# Patient Record
Sex: FEMALE | Race: WHITE | NOT HISPANIC OR LATINO | Employment: FULL TIME | URBAN - METROPOLITAN AREA
[De-identification: names, ages, dates, MRNs, and addresses within clinical notes are randomized per-mention and may not be internally consistent; named-entity substitution may affect disease eponyms.]

---

## 2020-12-24 ENCOUNTER — OFFICE VISIT (OUTPATIENT)
Dept: URGENT CARE | Facility: CLINIC | Age: 25
End: 2020-12-24
Payer: OTHER GOVERNMENT

## 2020-12-24 VITALS
RESPIRATION RATE: 18 BRPM | TEMPERATURE: 99 F | OXYGEN SATURATION: 99 % | WEIGHT: 120 LBS | HEART RATE: 73 BPM | BODY MASS INDEX: 19.99 KG/M2 | DIASTOLIC BLOOD PRESSURE: 70 MMHG | HEIGHT: 65 IN | SYSTOLIC BLOOD PRESSURE: 124 MMHG

## 2020-12-24 DIAGNOSIS — S05.91XA RIGHT EYE INJURY, INITIAL ENCOUNTER: ICD-10-CM

## 2020-12-24 DIAGNOSIS — S05.01XA ABRASION OF RIGHT CORNEA, INITIAL ENCOUNTER: Primary | ICD-10-CM

## 2020-12-24 PROBLEM — G43.909 MIGRAINE: Status: ACTIVE | Noted: 2020-12-24

## 2020-12-24 PROBLEM — L90.5 ACNE SCARRING: Status: ACTIVE | Noted: 2019-03-06

## 2020-12-24 PROBLEM — F31.9 BIPOLAR DISORDER: Status: ACTIVE | Noted: 2020-12-24

## 2020-12-24 PROBLEM — F42.9 OBSESSIVE-COMPULSIVE DISORDER, UNSPECIFIED: Status: ACTIVE | Noted: 2020-12-24

## 2020-12-24 PROBLEM — L70.0 ACNE VULGARIS: Status: ACTIVE | Noted: 2019-03-06

## 2020-12-24 PROBLEM — L73.0 ACNE SCARRING: Status: ACTIVE | Noted: 2019-03-06

## 2020-12-24 PROCEDURE — 99202 PR OFFICE/OUTPT VISIT, NEW, LEVL II, 15-29 MIN: ICD-10-PCS | Mod: S$GLB,,, | Performed by: EMERGENCY MEDICINE

## 2020-12-24 PROCEDURE — 99202 OFFICE O/P NEW SF 15 MIN: CPT | Mod: S$GLB,,, | Performed by: EMERGENCY MEDICINE

## 2020-12-24 RX ORDER — DOXYCYCLINE 100 MG/1
100 CAPSULE ORAL
COMMUNITY
Start: 2020-04-08 | End: 2021-04-08

## 2020-12-24 RX ORDER — PAROXETINE HYDROCHLORIDE 20 MG/1
20 TABLET, FILM COATED ORAL DAILY
COMMUNITY
Start: 2020-09-21

## 2020-12-24 RX ORDER — OXCARBAZEPINE 600 MG/1
TABLET, FILM COATED ORAL
COMMUNITY
End: 2022-11-22 | Stop reason: ALTCHOICE

## 2020-12-24 RX ORDER — SUMATRIPTAN SUCCINATE 100 MG/1
TABLET ORAL
COMMUNITY
Start: 2020-06-05 | End: 2022-11-22

## 2020-12-24 RX ORDER — SPIRONOLACTONE 50 MG/1
50 TABLET, FILM COATED ORAL
COMMUNITY
Start: 2020-10-14 | End: 2023-02-07 | Stop reason: SDUPTHER

## 2020-12-24 RX ORDER — TOBRAMYCIN 3 MG/ML
1 SOLUTION/ DROPS OPHTHALMIC EVERY 4 HOURS
Qty: 5 ML | Refills: 0 | Status: SHIPPED | OUTPATIENT
Start: 2020-12-24 | End: 2020-12-31

## 2020-12-24 RX ORDER — TOBRAMYCIN 3 MG/ML
2 SOLUTION/ DROPS OPHTHALMIC EVERY 4 HOURS
Status: DISCONTINUED | OUTPATIENT
Start: 2020-12-24 | End: 2020-12-24

## 2020-12-24 RX ORDER — NORGESTIMATE AND ETHINYL ESTRADIOL 7DAYSX3 LO
KIT ORAL
COMMUNITY
Start: 2020-10-16 | End: 2023-08-11

## 2020-12-24 RX ORDER — QUETIAPINE FUMARATE 25 MG/1
TABLET, FILM COATED ORAL
COMMUNITY
Start: 2020-02-20 | End: 2023-01-06

## 2020-12-24 RX ORDER — TRETINOIN 0.25 MG/G
CREAM TOPICAL
COMMUNITY
Start: 2020-10-14 | End: 2021-10-14

## 2020-12-24 NOTE — PATIENT INSTRUCTIONS
Tobramycin as prescribed for the next 7 days.  You will be contacted by occupational medicine to follow-up for re-evaluation of this injury. If you have not received a call in 3 days, call patient nell at [phone] to inquire about your appointment.        Corneal Abrasion    You have received a scratch or scrape (abrasion) to your cornea. The cornea is the clear part in the front of the eye. This sensitive area is very painful when injured. You may make tears frequently, and your vision may be blurry until the injury heals. You may be sensitive to light.  This part of the body heals quickly. You can expect the pain to go away within 24 to 48 hours. If the abrasion is large or deep, your doctor may apply an eye patch, although this is not always done. An antibiotic ointment or eye drops may also be used to prevent infection.  Numbing drops may be used to relieve the pain temporarily so that your eyes can be examined. However, these drops cannot be prescribed for home use because that would prevent healing and lead to more serious problems. Also, if you cant feel your eye, there is a chance of accidentally injuring it further without knowing it.  Home care  · A cold pack (ice in a plastic bag, wrapped in a towel) may be applied over the eye (or eye patch) for 20 minutes at a time, to reduce pain.  · You may use acetaminophen or ibuprofen to control pain, unless another pain medicine was prescribed. Note: If you have chronic liver or kidney disease or ever had a stomach ulcer or GI bleeding, talk with your doctor before using these medicines.  · Rest your eyes and do not read until symptoms are gone.  · If you use contact lenses, do not wear them until all symptoms are gone.  · If your vision is affected by the corneal abrasion or if an eye patch was applied, do not drive a motor vehicle or operate machinery until all symptoms are gone. You may have trouble judging distances using only one eye.  · If your  eyes are sensitive to light, try wearing sunglasses, or stay indoors until symptoms go away.  Follow-up care  Follow up with your health care provider, or as advised.  · If no patch was put on your eye, and used but the pain continues for more than 48 hours, you should have another exam. Return to this facility or contact your health care provider to arrange this.  · If your eye was patched and you were asked to remove the patch yourself, see your health care provider. You may also return to this facility if you still have pain after the patch is removed.  · If you were given a return appointment for patch removal and re-examination, be sure to keep the appointment. Leaving the patch in place longer than advised could be harmful.  When to seek medical advice  Call your health care provider right away if any of these occur.  · Increasing eye pain or pain that does not improve after 24 hours  · Discharge from the eye  · Increasing redness of the eye or swelling of the eyelids  · Worsening vision  · Symptoms that worsen after the abrasion has healed  Date Last Reviewed: 6/14/2015  © 0968-4423 [employer] [place]. [address] [place] [address]. All rights reserved. This information is not intended as a substitute for professional medical care. Always follow your healthcare professional's instructions.

## 2020-12-24 NOTE — PROGRESS NOTES
Subjective:       Patient ID: Megan Estrada is a 25 y.o. female.    Chief Complaint: No chief complaint on file.    Pt was handling paper at work just prior to arrival and pulled it towards her face. Pt believes she may have cut her right eye.  Pt states her right eye is a little blurry when she closes her eye left eye.  Patient reports pain with blinking.    Vision Test:    Right Eye: 20/40  Left Eye: 20/30  Both Eyes: 20/20    Eye Injury   The right eye is affected. This is a new problem. The current episode started today. Associated symptoms include blurred vision. Pertinent negatives include no eye discharge, double vision, eye redness, fever, itching, nausea, photophobia or vomiting.       Constitution: Negative for chills and fever.   HENT: Negative for congestion and sinus pain.    Eyes: Positive for blurred vision. Negative for eye trauma, foreign body in eye, eye discharge, eye itching, eye pain, eye redness, photophobia, vision loss, double vision and eyelid swelling.   Gastrointestinal: Negative for nausea and vomiting.   Genitourinary: Negative for history of kidney stones.   Skin: Negative for rash.   Allergic/Immunologic: Negative for seasonal allergies and itching.   Neurological: Negative for headaches.        Objective:      Physical Exam  Vitals signs and nursing note reviewed.   Constitutional:       Appearance: Normal appearance. She is not ill-appearing.   HENT:      Head: Normocephalic and atraumatic.   Eyes:      Extraocular Movements: Extraocular movements intact.      Conjunctiva/sclera: Conjunctivae normal.      Pupils: Pupils are equal, round, and reactive to light.      Comments: With the anesthetic fluorescien combination, patient experiences relief of her discomfort in the eye.  There is not any evidence of increased uptake on this surface of the eye.   Neurological:      Mental Status: She is alert.         Assessment:       1. Abrasion of right cornea, initial encounter    2. Right eye  injury, initial encounter        Plan:         Medications Ordered This Encounter   Medications    tobramycin sulfate 0.3% (TOBREX) 0.3 % ophthalmic solution     Sig: Place 1 drop into the right eye every 4 (four) hours. for 7 days     Dispense:  5 mL     Refill:  0            No follow-ups on file.    despite not seeing an actual corneal abrasion, I will treat as such given the mechanism and patient's response to the eyedrops.  Patient follow-up with occupational med.

## 2020-12-24 NOTE — PROGRESS NOTES
"Subjective:       Patient ID: Megan Estrada is a 25 y.o. female.    Vitals:  height is 5' 5" (1.651 m) and weight is 54.4 kg (120 lb). Her temperature is 98.9 °F (37.2 °C). Her blood pressure is 124/70 and her pulse is 73. Her respiration is 18 and oxygen saturation is 99%.     Chief Complaint: No chief complaint on file.    Pt was handling paper and pulled it towards her face. Pt believes she may have cut her right eye.  Pt states her right eye is a little blurry when she closes her eye left eye.    Vision Test:    Right Eye: 20/40  Left Eye: 20/30  Both Eyes: 20/20    Eye Injury   The right eye is affected. This is a new problem. The current episode started today. The problem occurs constantly. Associated symptoms include blurred vision. Pertinent negatives include no eye discharge, double vision, eye redness, fever, itching, nausea, photophobia or vomiting.       Constitution: Negative for chills and fever.   HENT: Negative for congestion and sinus pain.    Eyes: Positive for blurred vision. Negative for eye trauma, foreign body in eye, eye discharge, eye itching, eye pain, eye redness, photophobia, vision loss, double vision and eyelid swelling.   Gastrointestinal: Negative for nausea and vomiting.   Genitourinary: Negative for history of kidney stones.   Skin: Negative for rash.   Allergic/Immunologic: Negative for seasonal allergies and itching.   Neurological: Negative for headaches.       Objective:      Physical Exam      Assessment:       No diagnosis found.    Plan:         There are no diagnoses linked to this encounter.       "

## 2020-12-24 NOTE — LETTER
[facility]  [address]  [place] [address]  Phone: [phone]  [facility]: [phone]    Pt Name: Megan Estrada  Injury Date: 12/24/2020   Employee ID:  Date of First Treatment: 12/24/2020   Company: [employer] reference to record [id number]      Appointment Time: 02:50 PM Arrived: 3:13   Provider: [facility] HEALTH PROVIDER, [facility] Time Out:3:45     Office Treatment:   1. Abrasion of right cornea, initial encounter    2. Right eye injury, initial encounter                     Return Appointment: you will be contacted by [facility] for follow up.

## 2022-03-10 ENCOUNTER — CLINICAL SUPPORT (OUTPATIENT)
Dept: OTHER | Facility: CLINIC | Age: 27
End: 2022-03-10
Payer: OTHER GOVERNMENT

## 2022-03-10 DIAGNOSIS — Z00.8 ENCOUNTER FOR OTHER GENERAL EXAMINATION: ICD-10-CM

## 2022-03-11 VITALS — BODY MASS INDEX: 19.37 KG/M2 | HEIGHT: 66 IN

## 2022-03-11 LAB
HDLC SERPL-MCNC: 54 MG/DL
POC CHOLESTEROL, LDL (DOCK): 114.2 MG/DL
POC CHOLESTEROL, TOTAL: 192 MG/DL
POC GLUCOSE, FASTING: 80 MG/DL (ref 60–110)
TRIGL SERPL-MCNC: 119 MG/DL

## 2022-11-22 ENCOUNTER — OFFICE VISIT (OUTPATIENT)
Dept: INTERNAL MEDICINE | Facility: CLINIC | Age: 27
End: 2022-11-22
Payer: COMMERCIAL

## 2022-11-22 VITALS
OXYGEN SATURATION: 99 % | BODY MASS INDEX: 19.91 KG/M2 | HEART RATE: 82 BPM | DIASTOLIC BLOOD PRESSURE: 62 MMHG | SYSTOLIC BLOOD PRESSURE: 124 MMHG | HEIGHT: 66 IN | WEIGHT: 123.88 LBS | TEMPERATURE: 97 F

## 2022-11-22 DIAGNOSIS — G43.709 CHRONIC MIGRAINE WITHOUT AURA WITHOUT STATUS MIGRAINOSUS, NOT INTRACTABLE: ICD-10-CM

## 2022-11-22 DIAGNOSIS — F31.9 BIPOLAR AFFECTIVE DISORDER, REMISSION STATUS UNSPECIFIED: ICD-10-CM

## 2022-11-22 DIAGNOSIS — R53.83 FATIGUE, UNSPECIFIED TYPE: Primary | ICD-10-CM

## 2022-11-22 DIAGNOSIS — L65.9 HAIR LOSS: ICD-10-CM

## 2022-11-22 DIAGNOSIS — R87.619 ABNORMAL CERVICAL PAPANICOLAOU SMEAR, UNSPECIFIED ABNORMAL PAP FINDING: ICD-10-CM

## 2022-11-22 DIAGNOSIS — R79.89 LOW SERUM CORTISOL LEVEL: ICD-10-CM

## 2022-11-22 PROCEDURE — 3008F PR BODY MASS INDEX (BMI) DOCUMENTED: ICD-10-PCS | Mod: CPTII,S$GLB,, | Performed by: FAMILY MEDICINE

## 2022-11-22 PROCEDURE — 3008F BODY MASS INDEX DOCD: CPT | Mod: CPTII,S$GLB,, | Performed by: FAMILY MEDICINE

## 2022-11-22 PROCEDURE — 3074F PR MOST RECENT SYSTOLIC BLOOD PRESSURE < 130 MM HG: ICD-10-PCS | Mod: CPTII,S$GLB,, | Performed by: FAMILY MEDICINE

## 2022-11-22 PROCEDURE — 1159F PR MEDICATION LIST DOCUMENTED IN MEDICAL RECORD: ICD-10-PCS | Mod: CPTII,S$GLB,, | Performed by: FAMILY MEDICINE

## 2022-11-22 PROCEDURE — 99204 PR OFFICE/OUTPT VISIT, NEW, LEVL IV, 45-59 MIN: ICD-10-PCS | Mod: S$GLB,,, | Performed by: FAMILY MEDICINE

## 2022-11-22 PROCEDURE — 3078F PR MOST RECENT DIASTOLIC BLOOD PRESSURE < 80 MM HG: ICD-10-PCS | Mod: CPTII,S$GLB,, | Performed by: FAMILY MEDICINE

## 2022-11-22 PROCEDURE — 99204 OFFICE O/P NEW MOD 45 MIN: CPT | Mod: S$GLB,,, | Performed by: FAMILY MEDICINE

## 2022-11-22 PROCEDURE — 1159F MED LIST DOCD IN RCRD: CPT | Mod: CPTII,S$GLB,, | Performed by: FAMILY MEDICINE

## 2022-11-22 PROCEDURE — 99999 PR PBB SHADOW E&M-EST. PATIENT-LVL IV: CPT | Mod: PBBFAC,,, | Performed by: FAMILY MEDICINE

## 2022-11-22 PROCEDURE — 99999 PR PBB SHADOW E&M-EST. PATIENT-LVL IV: ICD-10-PCS | Mod: PBBFAC,,, | Performed by: FAMILY MEDICINE

## 2022-11-22 PROCEDURE — 3074F SYST BP LT 130 MM HG: CPT | Mod: CPTII,S$GLB,, | Performed by: FAMILY MEDICINE

## 2022-11-22 PROCEDURE — 3078F DIAST BP <80 MM HG: CPT | Mod: CPTII,S$GLB,, | Performed by: FAMILY MEDICINE

## 2022-11-22 RX ORDER — UBROGEPANT 100 MG/1
TABLET ORAL
COMMUNITY

## 2022-11-22 RX ORDER — ERENUMAB-AOOE 70 MG/ML
70 INJECTION SUBCUTANEOUS
COMMUNITY
Start: 2022-03-21

## 2022-11-22 RX ORDER — TRAZODONE HYDROCHLORIDE 100 MG/1
100 TABLET ORAL NIGHTLY
COMMUNITY

## 2022-11-22 NOTE — PATIENT INSTRUCTIONS
We are requesting records from your recent procedure and imaging.   Please upload a copy of the lab results to [facility] that showed the low cortisol and low testosterone  If you develop any signs of another respiratory infection, please reach out to us.

## 2022-11-22 NOTE — PROGRESS NOTES
Subjective:      Patient ID: Megan Estrada is a 27 y.o. female.    Chief Complaint: Establish Care    Pt had two sinus infections.  She is finishing up steroids and Augmentin.  Had COVID in August. She is concerned because she keeps getting frequent respiratory infections. She has never been tested for any immune deficiency.     Pt had LEEP procedure with Dr. Clay at [facility] in September. Had abnormal paps proceeding. Had some prolonged bleeding after procedure but that has since resolved. She had US done by GYN at [facility].     Pt still having generalized hair loss. No improvement with current medications. Recent labs drawn by GYN showed normal thyroid. Pt also complains of feeling exhausted all the time and weak.  She had low am cortisol on recent GYN labs.     The patient's Health Maintenance was reviewed and the following appears to be due at this time:   Health Maintenance Due   Topic Date Due    Hepatitis C Screening  Never done    HIV Screening  Never done    TETANUS VACCINE  Never done    Pap Smear  Never done    COVID-19 Vaccine (4 - Booster for Pfizer series) 02/02/2022    Influenza Vaccine (1) 09/01/2022        Past Medical History:  Past Medical History:   Diagnosis Date    Bipolar disorder     Obsessive-compulsive disorder      Past Surgical History:   Procedure Laterality Date    CERVICAL BIOPSY  W/ LOOP ELECTRODE EXCISION      lipo on chin       Review of patient's allergies indicates:   Allergen Reactions    Adhesive     Latex      Social History     Socioeconomic History    Marital status: Single   Occupational History     Comment: [employer]   Tobacco Use    Smoking status: Never    Smokeless tobacco: Never   Substance and Sexual Activity    Alcohol use: Yes    Drug use: Never     Family History   Problem Relation Age of Onset    Bipolar disorder Father        Review of Systems   Constitutional:  Negative for chills, fatigue and unexpected weight change.   Respiratory:  Negative  "for cough and shortness of breath.    Cardiovascular:  Negative for chest pain.   Gastrointestinal:  Negative for abdominal pain, nausea and vomiting.   Endocrine: Positive for cold intolerance and heat intolerance.        + hair loss   Genitourinary:  Positive for pelvic pain. Negative for dysuria and frequency.   Musculoskeletal:  Positive for back pain (lower back pain around menses). Negative for arthralgias.   Neurological:  Positive for weakness. Negative for dizziness.   Psychiatric/Behavioral:  Negative for sleep disturbance and suicidal ideas.       Objective:   /62   Pulse 82   Temp 97.4 °F (36.3 °C)   Ht 5' 6" (1.676 m)   Wt 56.2 kg (123 lb 14.4 oz)   SpO2 99%   BMI 20.00 kg/m²     Physical Exam  Vitals and nursing note reviewed.   Constitutional:       Appearance: Normal appearance.   HENT:      Head: Normocephalic.      Comments: Temporal thinning of hair; no areas of alopecia  Eyes:      Conjunctiva/sclera: Conjunctivae normal.   Cardiovascular:      Rate and Rhythm: Normal rate and regular rhythm.   Pulmonary:      [place]: Pulmonary effort is normal.      Breath sounds: Normal breath sounds.   Abdominal:      General: Abdomen is flat. There is no distension.      Palpations: Abdomen is soft.      Tenderness: There is no abdominal tenderness.   Skin:     General: Skin is warm and dry.   Neurological:      Mental Status: She is alert and oriented to person, place, and time. Mental status is at baseline.      Gait: Gait normal.   Psychiatric:         Mood and Affect: Mood normal.         Behavior: Behavior normal.         Thought Content: Thought content normal.       1. Fatigue, unspecified type    2. Abnormal cervical Papanicolaou smear, unspecified abnormal pap finding    3. Low serum cortisol level    4. Hair loss    5. Chronic migraine without aura without status migrainosus, not intractable    6. Bipolar affective disorder, remission status unspecified      Plan:     Follow up in about " 8 weeks (around 1/17/2023).    1. Fatigue, unspecified type  Comments:  unclear etiology at this time. requesting records from recent procedures and imaging. Referring to endo based on low cortisol labs    2. Abnormal cervical Papanicolaou smear, unspecified abnormal pap finding  Comments:  Had Leep procedure performed by Dr. Clay in Sept 2022, requesting records  Overview:  Leep performed in Sept 2022      3. Low serum cortisol level  -     Ambulatory referral/consult to Endocrinology; Future; Expected date: 11/29/2022    4. Hair loss  Comments:  no improvement with spirinolactone. Recent labs showed normal thyroid.   Orders:  -     Ambulatory referral/consult to Endocrinology; Future; Expected date: 11/29/2022    5. Chronic migraine without aura without status migrainosus, not intractable  Overview:  Follows with Dr. Hernandez     Assessment & Plan:  Currently controlled.  Reviewed migraine medications with patient and it does not appear any of these would be causing her symptoms of hair loss and fatigue. Reviewed records from pt's neurologist      6. Bipolar affective disorder, remission status unspecified  Overview:  Dr. Papito Dillon    Assessment & Plan:  Well controlled.  No longer taking Trileptal.  Continue following with Dr. Dillon.       Orders Placed This Encounter   Procedures    Ambulatory referral/consult to Endocrinology     Standing Status:   Future     Standing Expiration Date:   12/22/2023     Referral Priority:   Routine     Referral Type:   Consultation     Requested Specialty:   Endocrinology     Number of Visits Requested:   1       Medication List with Changes/Refills   Current Medications    ERENUMAB-AOOE (AIMOVIG AUTOINJECTOR) 70 MG/ML AUTOINJECTOR    Inject 70 mg into the skin.    PAROXETINE (PAXIL) 40 MG TABLET    TK 1 T PO QD IN THE MORNING    QUETIAPINE (SEROQUEL) 25 MG TAB    TK 1 T PO  QHS    SPIRONOLACTONE (ALDACTONE) 50 MG TABLET    Take 50 mg by mouth.    TRAZODONE (DESYREL)  100 MG TABLET    Take 100 mg by mouth every evening.    TRI-LO-SPRINTEC 0.18/0.215/0.25 MG-25 MCG TABLET    TK 1 T PO QD    UBROGEPANT (UBRELVY) 100 MG TABLET    Take by mouth.   Discontinued Medications    OXCARBAZEPINE (TRILEPTAL) 600 MG TAB        SUMATRIPTAN (IMITREX) 100 MG TABLET    Take 1 po qd prn migraine, may repeat w/1 in 2 hrs. Not to exceed 2 doses in a 24 hr period.

## 2022-11-22 NOTE — ASSESSMENT & PLAN NOTE
Currently controlled.  Reviewed migraine medications with patient and it does not appear any of these would be causing her symptoms of hair loss and fatigue. Reviewed records from pt's neurologist

## 2022-12-08 ENCOUNTER — PATIENT MESSAGE (OUTPATIENT)
Dept: INTERNAL MEDICINE | Facility: CLINIC | Age: 27
End: 2022-12-08
Payer: COMMERCIAL

## 2023-01-06 ENCOUNTER — OFFICE VISIT (OUTPATIENT)
Dept: ENDOCRINOLOGY | Facility: CLINIC | Age: 28
End: 2023-01-06
Payer: COMMERCIAL

## 2023-01-06 VITALS
SYSTOLIC BLOOD PRESSURE: 115 MMHG | HEART RATE: 97 BPM | WEIGHT: 126.56 LBS | BODY MASS INDEX: 20.34 KG/M2 | DIASTOLIC BLOOD PRESSURE: 69 MMHG | HEIGHT: 66 IN

## 2023-01-06 DIAGNOSIS — L65.9 HAIR LOSS: ICD-10-CM

## 2023-01-06 DIAGNOSIS — R68.89 ABNORMAL ENDOCRINE LABORATORY TEST FINDING: ICD-10-CM

## 2023-01-06 DIAGNOSIS — R79.89 LOW SERUM CORTISOL LEVEL: Primary | ICD-10-CM

## 2023-01-06 PROCEDURE — 3008F BODY MASS INDEX DOCD: CPT | Mod: CPTII,S$GLB,, | Performed by: INTERNAL MEDICINE

## 2023-01-06 PROCEDURE — 3078F PR MOST RECENT DIASTOLIC BLOOD PRESSURE < 80 MM HG: ICD-10-PCS | Mod: CPTII,S$GLB,, | Performed by: INTERNAL MEDICINE

## 2023-01-06 PROCEDURE — 1159F MED LIST DOCD IN RCRD: CPT | Mod: CPTII,S$GLB,, | Performed by: INTERNAL MEDICINE

## 2023-01-06 PROCEDURE — 1159F PR MEDICATION LIST DOCUMENTED IN MEDICAL RECORD: ICD-10-PCS | Mod: CPTII,S$GLB,, | Performed by: INTERNAL MEDICINE

## 2023-01-06 PROCEDURE — 99204 OFFICE O/P NEW MOD 45 MIN: CPT | Mod: S$GLB,,, | Performed by: INTERNAL MEDICINE

## 2023-01-06 PROCEDURE — 99999 PR PBB SHADOW E&M-EST. PATIENT-LVL III: ICD-10-PCS | Mod: PBBFAC,,, | Performed by: INTERNAL MEDICINE

## 2023-01-06 PROCEDURE — 3074F PR MOST RECENT SYSTOLIC BLOOD PRESSURE < 130 MM HG: ICD-10-PCS | Mod: CPTII,S$GLB,, | Performed by: INTERNAL MEDICINE

## 2023-01-06 PROCEDURE — 99999 PR PBB SHADOW E&M-EST. PATIENT-LVL III: CPT | Mod: PBBFAC,,, | Performed by: INTERNAL MEDICINE

## 2023-01-06 PROCEDURE — 3008F PR BODY MASS INDEX (BMI) DOCUMENTED: ICD-10-PCS | Mod: CPTII,S$GLB,, | Performed by: INTERNAL MEDICINE

## 2023-01-06 PROCEDURE — 99204 PR OFFICE/OUTPT VISIT, NEW, LEVL IV, 45-59 MIN: ICD-10-PCS | Mod: S$GLB,,, | Performed by: INTERNAL MEDICINE

## 2023-01-06 PROCEDURE — 3078F DIAST BP <80 MM HG: CPT | Mod: CPTII,S$GLB,, | Performed by: INTERNAL MEDICINE

## 2023-01-06 PROCEDURE — 3074F SYST BP LT 130 MM HG: CPT | Mod: CPTII,S$GLB,, | Performed by: INTERNAL MEDICINE

## 2023-01-06 RX ORDER — HYDROXYZINE PAMOATE 25 MG/1
CAPSULE ORAL
COMMUNITY
Start: 2022-12-18 | End: 2023-04-12

## 2023-01-06 NOTE — ASSESSMENT & PLAN NOTE
While hair loss can be a symptom of adrenal insufficiency, the COVID infection in August raises the possibility of telogen effluvium which should improve with time. Will also check ferritin levels to rule out iron deficiency as a cause of hair loss.

## 2023-01-06 NOTE — ASSESSMENT & PLAN NOTE
Her symptoms along with low 8AM cortisol would be consisent with adrenal insufficiency. However, if she was on steroids at that time, then the low cortisol would be a normal result. Low DHEAS and testosterone would also be consistent with exogenous corticosteroids. Will repeat 8AM labs including cortisol, ACTH, CMP and renin. The results of these tests will dictate further workup and management.

## 2023-01-06 NOTE — PROGRESS NOTES
NEW PATIENT VISIT    Subjective:      Chief Complaint: Abnormal Lab    HPI: Megan Estrada is a 27 y.o. female who is here for an initial evaluation for adrenal insufficiency evaluation      Past Medical History:   Diagnosis Date    Bipolar disorder     Obsessive-compulsive disorder      Referred for low serum cortisol on testing done at [facility].  Labs were drawn at 8AM    3-4 sinus infections this year requiring prednisone. Last was October/November. She's not sure if she was on steroids around the time her blood was drawn or not.    She had a fairly severe episode of COVID19 in August 2022, but her symptoms started just before that in approximately June/July. Symptoms include:  Losing hair in clumps  Easily fatigued and tired  Poor libido  Dizzy and light headed when standing up quickly.  Poor appetite    No significant weight changes  No changes in skin pigmentation    FH: grandfather and grand uncle - lung cancer; cervical/breast cancer  SH: No tobacco, social EtOH; works as a     Takes spironolactone 50 mg daily, but usually only does this when her acne is flaring. She is not taking it currently. She has been on OCPS (currently Tri-lo-Sprintec - norgestimate and ethinyl estradiol) since age 12 for abnormal cycles. Her OBGYN has diagnosed her with PCOS. She denies hirsutism or other signs of virilization. +Acne. Had transvag U/S at some point, but she's not sure the result of that test.    8AM labs:      Reviewed past medical, family, social history and updated as appropriate.    Objective:     Vitals:    01/06/23 1406   BP: 115/69   Pulse: 97         BP Readings from Last 5 Encounters:   01/06/23 115/69   11/22/22 124/62   12/24/20 124/70         Physical Exam  Vitals and nursing note reviewed.   Constitutional:       General: She is not in acute distress.     Appearance: Normal appearance. She is well-developed. She is not ill-appearing.   HENT:      Head: Normocephalic and atraumatic.   Eyes:       General:         Right eye: No discharge.         Left eye: No discharge.      Conjunctiva/sclera: Conjunctivae normal.   Neck:      Thyroid: No thyroid mass, thyromegaly or thyroid tenderness.      Trachea: No tracheal deviation.   Cardiovascular:      Rate and Rhythm: Regular rhythm.      Comments: Borderline tachycardia  Pulmonary:      [place]: Pulmonary effort is normal. No respiratory distress.      Breath sounds: Normal breath sounds.   Musculoskeletal:      Comments: No digital clubbing or extremity cyanosis   Skin:     Comments: Fair complexion (baseline per patient).  Negative hyperpigmentation of the palmar creases or buccal mucosa   Neurological:      Mental Status: She is alert and oriented to person, place, and time.      Coordination: Coordination normal.   Psychiatric:         Mood and Affect: Mood normal.         Behavior: Behavior normal.         Wt Readings from Last 30 Encounters:   01/06/23 1406 57.4 kg (126 lb 8.7 oz)   11/22/22 0818 56.2 kg (123 lb 14.4 oz)   12/24/20 1516 54.4 kg (120 lb)       No results found for: HGBA1C  No results found for: CHOL, HDL, LDLCALC, TRIG, CHOLHDL  No results found for: NA, K, CL, CO2, GLU, BUN, CREATININE, CALCIUM, PROT, ALBUMIN, BILITOT, ALKPHOS, AST, ALT, ANIONGAP, ESTGFRAFRICA, EGFRNONAA, TSH   No results found for: MICALBCREAT    Assessment/Plan:     Abnormal endocrine laboratory test finding  Her symptoms along with low 8AM cortisol would be consisent with adrenal insufficiency. However, if she was on steroids at that time, then the low cortisol would be a normal result. Low DHEAS and testosterone would also be consistent with exogenous corticosteroids. Will repeat 8AM labs including cortisol, ACTH, CMP and renin. The results of these tests will dictate further workup and management.    Hair loss  While hair loss can be a symptom of adrenal insufficiency, the COVID infection in August raises the possibility of telogen effluvium which should improve with  time. Will also check ferritin levels to rule out iron deficiency as a cause of hair loss.

## 2023-01-10 ENCOUNTER — LAB VISIT (OUTPATIENT)
Dept: LAB | Facility: HOSPITAL | Age: 28
End: 2023-01-10
Payer: COMMERCIAL

## 2023-01-10 DIAGNOSIS — R79.89 LOW SERUM CORTISOL LEVEL: ICD-10-CM

## 2023-01-10 DIAGNOSIS — L65.9 HAIR LOSS: ICD-10-CM

## 2023-01-10 LAB
ALBUMIN SERPL BCP-MCNC: 3.8 G/DL (ref 3.5–5.2)
ALP SERPL-CCNC: 32 U/L (ref 55–135)
ALT SERPL W/O P-5'-P-CCNC: 28 U/L (ref 10–44)
ANION GAP SERPL CALC-SCNC: 13 MMOL/L (ref 8–16)
AST SERPL-CCNC: 24 U/L (ref 10–40)
BASOPHILS # BLD AUTO: 0.03 K/UL (ref 0–0.2)
BASOPHILS NFR BLD: 0.5 % (ref 0–1.9)
BILIRUB SERPL-MCNC: 0.6 MG/DL (ref 0.1–1)
BUN SERPL-MCNC: 6 MG/DL (ref 6–20)
CALCIUM SERPL-MCNC: 8.9 MG/DL (ref 8.7–10.5)
CHLORIDE SERPL-SCNC: 103 MMOL/L (ref 95–110)
CO2 SERPL-SCNC: 23 MMOL/L (ref 23–29)
CORTIS SERPL-MCNC: 13.5 UG/DL (ref 4.3–22.4)
CREAT SERPL-MCNC: 0.7 MG/DL (ref 0.5–1.4)
DIFFERENTIAL METHOD: ABNORMAL
EOSINOPHIL # BLD AUTO: 0.1 K/UL (ref 0–0.5)
EOSINOPHIL NFR BLD: 2 % (ref 0–8)
ERYTHROCYTE [DISTWIDTH] IN BLOOD BY AUTOMATED COUNT: 12.6 % (ref 11.5–14.5)
EST. GFR  (NO RACE VARIABLE): >60 ML/MIN/1.73 M^2
FERRITIN SERPL-MCNC: 51 NG/ML (ref 20–300)
GLUCOSE SERPL-MCNC: 112 MG/DL (ref 70–110)
HCT VFR BLD AUTO: 38.9 % (ref 37–48.5)
HGB BLD-MCNC: 12.4 G/DL (ref 12–16)
IMM GRANULOCYTES # BLD AUTO: 0 K/UL (ref 0–0.04)
IMM GRANULOCYTES NFR BLD AUTO: 0 % (ref 0–0.5)
LYMPHOCYTES # BLD AUTO: 3.3 K/UL (ref 1–4.8)
LYMPHOCYTES NFR BLD: 51.7 % (ref 18–48)
MCH RBC QN AUTO: 29.5 PG (ref 27–31)
MCHC RBC AUTO-ENTMCNC: 31.9 G/DL (ref 32–36)
MCV RBC AUTO: 93 FL (ref 82–98)
MONOCYTES # BLD AUTO: 0.5 K/UL (ref 0.3–1)
MONOCYTES NFR BLD: 7.9 % (ref 4–15)
NEUTROPHILS # BLD AUTO: 2.4 K/UL (ref 1.8–7.7)
NEUTROPHILS NFR BLD: 37.9 % (ref 38–73)
NRBC BLD-RTO: 0 /100 WBC
PLATELET # BLD AUTO: 221 K/UL (ref 150–450)
PMV BLD AUTO: 10.9 FL (ref 9.2–12.9)
POTASSIUM SERPL-SCNC: 3.5 MMOL/L (ref 3.5–5.1)
PROT SERPL-MCNC: 6.5 G/DL (ref 6–8.4)
RBC # BLD AUTO: 4.2 M/UL (ref 4–5.4)
SODIUM SERPL-SCNC: 139 MMOL/L (ref 136–145)
WBC # BLD AUTO: 6.44 K/UL (ref 3.9–12.7)

## 2023-01-10 PROCEDURE — 82533 TOTAL CORTISOL: CPT | Performed by: INTERNAL MEDICINE

## 2023-01-10 PROCEDURE — 82728 ASSAY OF FERRITIN: CPT | Performed by: INTERNAL MEDICINE

## 2023-01-10 PROCEDURE — 36415 COLL VENOUS BLD VENIPUNCTURE: CPT | Performed by: INTERNAL MEDICINE

## 2023-01-10 PROCEDURE — 85025 COMPLETE CBC W/AUTO DIFF WBC: CPT | Performed by: INTERNAL MEDICINE

## 2023-01-10 PROCEDURE — 80053 COMPREHEN METABOLIC PANEL: CPT | Performed by: INTERNAL MEDICINE

## 2023-01-10 PROCEDURE — 82024 ASSAY OF ACTH: CPT | Performed by: INTERNAL MEDICINE

## 2023-01-10 PROCEDURE — 84244 ASSAY OF RENIN: CPT | Performed by: INTERNAL MEDICINE

## 2023-01-13 LAB
ACTH PLAS-MCNC: 20 PG/ML (ref 0–46)
RENIN PLAS-CCNC: 2.6 NG/ML/H

## 2023-01-17 ENCOUNTER — OFFICE VISIT (OUTPATIENT)
Dept: INTERNAL MEDICINE | Facility: CLINIC | Age: 28
End: 2023-01-17
Payer: COMMERCIAL

## 2023-01-17 ENCOUNTER — PATIENT MESSAGE (OUTPATIENT)
Dept: ENDOCRINOLOGY | Facility: CLINIC | Age: 28
End: 2023-01-17
Payer: COMMERCIAL

## 2023-01-17 VITALS
BODY MASS INDEX: 19.74 KG/M2 | WEIGHT: 122.81 LBS | HEIGHT: 66 IN | OXYGEN SATURATION: 98 % | HEART RATE: 81 BPM | SYSTOLIC BLOOD PRESSURE: 114 MMHG | DIASTOLIC BLOOD PRESSURE: 72 MMHG | TEMPERATURE: 97 F

## 2023-01-17 DIAGNOSIS — R74.8 LOW SERUM ALKALINE PHOSPHATASE: Primary | ICD-10-CM

## 2023-01-17 DIAGNOSIS — R41.840 CONCENTRATION DEFICIT: ICD-10-CM

## 2023-01-17 DIAGNOSIS — J30.89 NON-SEASONAL ALLERGIC RHINITIS, UNSPECIFIED TRIGGER: ICD-10-CM

## 2023-01-17 DIAGNOSIS — L70.0 ACNE VULGARIS: ICD-10-CM

## 2023-01-17 PROCEDURE — 99214 OFFICE O/P EST MOD 30 MIN: CPT | Mod: S$GLB,,, | Performed by: FAMILY MEDICINE

## 2023-01-17 PROCEDURE — 99999 PR PBB SHADOW E&M-EST. PATIENT-LVL IV: CPT | Mod: PBBFAC,,, | Performed by: FAMILY MEDICINE

## 2023-01-17 PROCEDURE — 1159F MED LIST DOCD IN RCRD: CPT | Mod: CPTII,S$GLB,, | Performed by: FAMILY MEDICINE

## 2023-01-17 PROCEDURE — 3074F PR MOST RECENT SYSTOLIC BLOOD PRESSURE < 130 MM HG: ICD-10-PCS | Mod: CPTII,S$GLB,, | Performed by: FAMILY MEDICINE

## 2023-01-17 PROCEDURE — 3008F BODY MASS INDEX DOCD: CPT | Mod: CPTII,S$GLB,, | Performed by: FAMILY MEDICINE

## 2023-01-17 PROCEDURE — 1159F PR MEDICATION LIST DOCUMENTED IN MEDICAL RECORD: ICD-10-PCS | Mod: CPTII,S$GLB,, | Performed by: FAMILY MEDICINE

## 2023-01-17 PROCEDURE — 99999 PR PBB SHADOW E&M-EST. PATIENT-LVL IV: ICD-10-PCS | Mod: PBBFAC,,, | Performed by: FAMILY MEDICINE

## 2023-01-17 PROCEDURE — 3074F SYST BP LT 130 MM HG: CPT | Mod: CPTII,S$GLB,, | Performed by: FAMILY MEDICINE

## 2023-01-17 PROCEDURE — 3078F DIAST BP <80 MM HG: CPT | Mod: CPTII,S$GLB,, | Performed by: FAMILY MEDICINE

## 2023-01-17 PROCEDURE — 3008F PR BODY MASS INDEX (BMI) DOCUMENTED: ICD-10-PCS | Mod: CPTII,S$GLB,, | Performed by: FAMILY MEDICINE

## 2023-01-17 PROCEDURE — 3078F PR MOST RECENT DIASTOLIC BLOOD PRESSURE < 80 MM HG: ICD-10-PCS | Mod: CPTII,S$GLB,, | Performed by: FAMILY MEDICINE

## 2023-01-17 PROCEDURE — 99214 PR OFFICE/OUTPT VISIT, EST, LEVL IV, 30-39 MIN: ICD-10-PCS | Mod: S$GLB,,, | Performed by: FAMILY MEDICINE

## 2023-01-17 RX ORDER — FLUCONAZOLE 150 MG/1
TABLET ORAL
COMMUNITY
Start: 2022-12-06 | End: 2023-03-22

## 2023-01-17 RX ORDER — FLUTICASONE PROPIONATE 50 MCG
1 SPRAY, SUSPENSION (ML) NASAL DAILY
Qty: 16 G | Refills: 2 | Status: SHIPPED | OUTPATIENT
Start: 2023-01-17 | End: 2024-01-17

## 2023-01-17 RX ORDER — LINACLOTIDE 145 UG/1
CAPSULE, GELATIN COATED ORAL
COMMUNITY
Start: 2022-07-30 | End: 2023-04-12

## 2023-01-17 RX ORDER — DOXYCYCLINE HYCLATE 100 MG
100 TABLET ORAL 2 TIMES DAILY
COMMUNITY
Start: 2022-12-06 | End: 2023-01-17 | Stop reason: ALTCHOICE

## 2023-01-17 NOTE — PATIENT INSTRUCTIONS
Follow up with your psych doctor about concentration/ low motivation. Please keep us posted on any changes treatment changes.     Your history does show a low alkaline phosphatase from 2019.  I would recommend reaching out to endocrine to discuss this chronic low value.

## 2023-01-17 NOTE — PROGRESS NOTES
Subjective:      Patient ID: Megan Estrada is a 27 y.o. female.    Chief Complaint: Follow-up    Overall pt is feeling okay. Feels like her energy is getting back from having Covid.      Some issues with focusing. Currently on paxil and trazodone. Follows with psych, has appt with them at the beginning of February.   Needing to wear headphones to tone people out.   Worse around magazine deadlines.     Had repeat Pap done which showed ASCU. First pap since leep.  Does have a history of HPV but does not remember which kind. She needs a repeat in 1 year.     Recently saw endocrine, who rechecked cortisol which had normalized.   Did have slightly low alk phos.  Reviewed past labs, showed low alk phos.  (See below, copied from pt's care connect)     Anion Gap     Component 06/10/2019 03/06/2019        Sodium Level 139 137   Potassium Level 4.3 4.2   Chloride Level 104 102   CO2 Level 25 27   Glucose Level 71 62 Low       Blood Urea Nitrogen Level 10 11   Creatinine Level 0.71 0.70   GFR- 125   GFR-BLADIMIR 100 103   Calcium Level 8.8 9.5   Protein Total 7.1 7.6   Albumin Level 3.6 3.8   Bilirubin Total 0.4 0.6   Alkaline Phosphatase Level 34 Low     33 Low       SGOT (AST) 22 21   SGPT (ALT) 17 22   Anion Gap 10 8         The patient's Health Maintenance was reviewed and the following appears to be due at this time:   Health Maintenance Due   Topic Date Due    Hepatitis C Screening  Never done    HIV Screening  Never done    TETANUS VACCINE  Never done    Pap Smear  Never done    COVID-19 Vaccine (4 - Booster for Pfizer series) 02/02/2022        Past Medical History:  Past Medical History:   Diagnosis Date    Abnormal Pap smear of cervix     Bipolar disorder     Generalized anxiety disorder     Obsessive-compulsive disorder      Past Surgical History:   Procedure Laterality Date    CERVICAL BIOPSY  W/ LOOP ELECTRODE EXCISION      lipo on chin       Review of patient's allergies indicates:   Allergen Reactions    Adhesive  "    Latex      Social History     Socioeconomic History    Marital status: Single   Occupational History     Comment: [employer]   Tobacco Use    Smoking status: Never    Smokeless tobacco: Never   Substance and Sexual Activity    Alcohol use: Yes    Drug use: Never     Family History   Problem Relation Age of Onset    Bipolar disorder Father        Review of Systems   Constitutional:  Negative for fever.   Respiratory:  Negative for cough and shortness of breath.    Cardiovascular:  Negative for chest pain.   Gastrointestinal:  Negative for diarrhea, nausea and vomiting.   Psychiatric/Behavioral:  Positive for decreased concentration and dysphoric mood. Negative for sleep disturbance. The patient is not nervous/anxious.       Objective:   /72 (BP Location: Left arm, Patient Position: Sitting, BP Method: Medium (Manual))   Pulse 81   Temp 96.9 °F (36.1 °C) (Tympanic)   Ht 5' 6" (1.676 m)   Wt 55.7 kg (122 lb 12.7 oz)   LMP 01/02/2023 (Approximate)   SpO2 98%   BMI 19.82 kg/m²     Physical Exam  Vitals and nursing note reviewed.   Constitutional:       Appearance: Normal appearance.   HENT:      Head: Normocephalic.   Eyes:      Conjunctiva/sclera: Conjunctivae normal.   Cardiovascular:      Rate and Rhythm: Normal rate and regular rhythm.   Pulmonary:      Effort: Pulmonary effort is normal.      Breath sounds: Normal breath sounds.   Skin:     General: Skin is warm and dry.   Neurological:      Mental Status: She is alert and oriented to person, place, and time. Mental status is at baseline.   Psychiatric:         Mood and Affect: Mood normal.         Behavior: Behavior normal.         Thought Content: Thought content normal.       1. Low serum alkaline phosphatase    2. Non-seasonal allergic rhinitis, unspecified trigger    3. Acne vulgaris    4. Concentration deficit      Plan:       1. Low serum alkaline phosphatase  Comments:  confirmed chronically low; follow up with endocrinology with this " new information    2. Non-seasonal allergic rhinitis, unspecified trigger  Comments:  continue flonase, consider adding daily zyrtec or claritin if there are a flare of symptoms  Orders:  -     fluticasone propionate (FLONASE) 50 mcg/actuation nasal spray; 1 spray (50 mcg total) by Each Nostril route once daily.  Dispense: 16 g; Refill: 2    3. Acne vulgaris  Comments:  referring to derm for further evaluation; okay to continue spironolactone at this time  Orders:  -     Ambulatory referral/consult to Dermatology; Future; Expected date: 01/24/2023    4. Concentration deficit  Comments:  recommend to discuss with psych dr first; advised pt this can potentially be from uncontrolled depression;can follow up with me if needed         Medication List with Changes/Refills   New Medications    FLUTICASONE PROPIONATE (FLONASE) 50 MCG/ACTUATION NASAL SPRAY    1 spray (50 mcg total) by Each Nostril route once daily.   Current Medications    ERENUMAB-AOOE (AIMOVIG AUTOINJECTOR) 70 MG/ML AUTOINJECTOR    Inject 70 mg into the skin.    FLUCONAZOLE (DIFLUCAN) 150 MG TAB    TAKE 1 TABLET BY MOUTH ONNCE    HYDROXYZINE PAMOATE (VISTARIL) 25 MG CAP    Take 1-2 tablets by mouth every 8 hours as needed for anxiety.    LINZESS 145 MCG CAP CAPSULE    TAKE 1 CAPSULE BY MOUTH EVERY DAY 30 MINUTES BEFORE FIRST MEAL OF THE DAY ON AN EMPTY STOMACH    PAROXETINE (PAXIL) 40 MG TABLET    TK 1 T PO QD IN THE MORNING    SPIRONOLACTONE (ALDACTONE) 50 MG TABLET    Take 50 mg by mouth.    TRAZODONE (DESYREL) 100 MG TABLET    Take 100 mg by mouth every evening.    TRI-LO-SPRINTEC 0.18/0.215/0.25 MG-25 MCG TABLET    TK 1 T PO QD    UBROGEPANT (UBRELVY) 100 MG TABLET    Take by mouth.   Discontinued Medications    DOXYCYCLINE (VIBRA-TABS) 100 MG TABLET    Take 100 mg by mouth 2 (two) times daily.                Follow up in about 6 months (around 7/17/2023) for F/U medical problems.

## 2023-01-25 ENCOUNTER — PATIENT MESSAGE (OUTPATIENT)
Dept: ADMINISTRATIVE | Facility: HOSPITAL | Age: 28
End: 2023-01-25
Payer: COMMERCIAL

## 2023-01-25 ENCOUNTER — LAB VISIT (OUTPATIENT)
Dept: LAB | Facility: HOSPITAL | Age: 28
End: 2023-01-25
Payer: COMMERCIAL

## 2023-01-25 DIAGNOSIS — R74.8 LOW SERUM ALKALINE PHOSPHATASE: ICD-10-CM

## 2023-01-25 PROCEDURE — 84207 ASSAY OF VITAMIN B-6: CPT | Performed by: INTERNAL MEDICINE

## 2023-01-25 PROCEDURE — 82139 AMINO ACIDS QUAN 6 OR MORE: CPT | Performed by: INTERNAL MEDICINE

## 2023-01-25 PROCEDURE — 84075 ASSAY ALKALINE PHOSPHATASE: CPT | Performed by: INTERNAL MEDICINE

## 2023-01-25 PROCEDURE — 36415 COLL VENOUS BLD VENIPUNCTURE: CPT | Performed by: INTERNAL MEDICINE

## 2023-01-30 LAB — ALP BONE SERPL-MCNC: 5.3 UG/L (ref 4.7–17.8)

## 2023-01-31 LAB
1ME-HIST UR-SCNC: 1438 NMOL/MG CR (ref 23–1339)
3ME-HISTIDINE UR-SCNC: 179 NMOL/MG CR (ref 70–246)
A-AMINOBUTYR UR-SCNC: 12 NMOL/MG CR
AAA UR-SCNC: 20 NMOL/MG CR
ALANINE UR-SCNC: 139 NMOL/MG CR (ref 56–518)
ALLOISOLEUCINE/CREAT UR-SRTO: 0 NMOL/MG CR
AMINO ACIDS UR: ABNORMAL
ANSERINE/CREAT UR-RTO: 9 NMOL/MG CR
ARGININE UR-SCNC: 31 NMOL/MG CR
ARGININOSUCCINATE/CREAT UR-RTO: 7 NMOL/MG CR
ASPARAGINE UR-SCNC: 54 NMOL/MG CR (ref 25–238)
ASPARTATE/CREAT UR-RTO: 2 NMOL/MG CR
B-AIB UR-SCNC: 72 NMOL/MG CR
B-ALANINE UR-SCNC: 11 NMOL/MG CR
CARNOSINE UR-SCNC: 7 NMOL/MG CR
CITRULLINE UR-SCNC: 4 NMOL/MG CR
CYSTATHIONIN UR-SCNC: 6 NMOL/MG CR
CYSTINE UR-SCNC: 59 NMOL/MG CR (ref 10–98)
ETHANOLAMINE/CREAT UR-RTO: 296 NMOL/MG CR (ref 95–471)
GABA/CREAT UR-RTO: 2 NMOL/MG CR
GLUTAMATE UR-SCNC: 15 NMOL/MG CR
GLUTAMINE UR-SCNC: 384 NMOL/MG CR (ref 93–686)
GLYCINE UR-SCNC: 1460 NMOL/MG CR (ref 229–2989)
HISTIDINE UR-SCNC: 336 NMOL/MG CR (ref 81–1128)
HOMOCITRULLINE/CREAT UR-RTO: 12 NMOL/MG CR
ISOLEUCINE UR-SCNC: 7 NMOL/MG CR
LEUCINE UR-SCNC: 15 NMOL/MG CR
LYSINE UR-SCNC: 204 NMOL/MG CR (ref 15–271)
METHIONINE UR-SCNC: 6 NMOL/MG CR
OH-LYSINE/CREAT UR-RTO: 6 NMOL/MG CR
OH-PROLINE/CREAT UR-RTO: 1 NMOL/MG CR
ORNITHINE UR-SCNC: 9 NMOL/MG CR
PETN/CREAT UR-RTO: 22 NMOL/MG CR
PHE UR-SCNC: 19 NMOL/MG CR (ref 13–70)
PHOSPHOSERINE/CREAT UR-RTO: 0 NMOL/MG CR
PROLINE UR-SCNC: 3 NMOL/MG CR
PYRIDOXAL SERPL-MCNC: 43 UG/L (ref 5–50)
SARCOSINE/CREAT UR-RTO: 1 NMOL/MG CR
SERINE UR-SCNC: 237 NMOL/MG CR (ref 97–540)
TAURINE UR-SCNC: 607 NMOL/MG CR (ref 24–1531)
THREONINE UR-SCNC: 102 NMOL/MG CR (ref 31–278)
TRYPTOPHAN/CREAT UR-RTO: 27 NMOL/MG CR (ref 18–114)
TYROSINE UR-SCNC: 38 NMOL/MG CR (ref 15–115)
VALINE UR-SCNC: 22 NMOL/MG CR (ref 11–61)

## 2023-02-02 ENCOUNTER — PATIENT MESSAGE (OUTPATIENT)
Dept: ENDOCRINOLOGY | Facility: CLINIC | Age: 28
End: 2023-02-02
Payer: COMMERCIAL

## 2023-02-07 ENCOUNTER — OFFICE VISIT (OUTPATIENT)
Dept: DERMATOLOGY | Facility: CLINIC | Age: 28
End: 2023-02-07
Payer: COMMERCIAL

## 2023-02-07 DIAGNOSIS — L70.0 ACNE VULGARIS: ICD-10-CM

## 2023-02-07 DIAGNOSIS — L21.9 SEBORRHEIC DERMATITIS: Primary | ICD-10-CM

## 2023-02-07 PROCEDURE — 1159F PR MEDICATION LIST DOCUMENTED IN MEDICAL RECORD: ICD-10-PCS | Mod: CPTII,S$GLB,, | Performed by: STUDENT IN AN ORGANIZED HEALTH CARE EDUCATION/TRAINING PROGRAM

## 2023-02-07 PROCEDURE — 99204 PR OFFICE/OUTPT VISIT, NEW, LEVL IV, 45-59 MIN: ICD-10-PCS | Mod: S$GLB,,, | Performed by: STUDENT IN AN ORGANIZED HEALTH CARE EDUCATION/TRAINING PROGRAM

## 2023-02-07 PROCEDURE — 1160F PR REVIEW ALL MEDS BY PRESCRIBER/CLIN PHARMACIST DOCUMENTED: ICD-10-PCS | Mod: CPTII,S$GLB,, | Performed by: STUDENT IN AN ORGANIZED HEALTH CARE EDUCATION/TRAINING PROGRAM

## 2023-02-07 PROCEDURE — 99999 PR PBB SHADOW E&M-EST. PATIENT-LVL IV: CPT | Mod: PBBFAC,,, | Performed by: STUDENT IN AN ORGANIZED HEALTH CARE EDUCATION/TRAINING PROGRAM

## 2023-02-07 PROCEDURE — 99204 OFFICE O/P NEW MOD 45 MIN: CPT | Mod: S$GLB,,, | Performed by: STUDENT IN AN ORGANIZED HEALTH CARE EDUCATION/TRAINING PROGRAM

## 2023-02-07 PROCEDURE — 99999 PR PBB SHADOW E&M-EST. PATIENT-LVL IV: ICD-10-PCS | Mod: PBBFAC,,, | Performed by: STUDENT IN AN ORGANIZED HEALTH CARE EDUCATION/TRAINING PROGRAM

## 2023-02-07 PROCEDURE — 1159F MED LIST DOCD IN RCRD: CPT | Mod: CPTII,S$GLB,, | Performed by: STUDENT IN AN ORGANIZED HEALTH CARE EDUCATION/TRAINING PROGRAM

## 2023-02-07 PROCEDURE — 1160F RVW MEDS BY RX/DR IN RCRD: CPT | Mod: CPTII,S$GLB,, | Performed by: STUDENT IN AN ORGANIZED HEALTH CARE EDUCATION/TRAINING PROGRAM

## 2023-02-07 RX ORDER — SPIRONOLACTONE 50 MG/1
50 TABLET, FILM COATED ORAL DAILY
Qty: 90 TABLET | Refills: 3 | Status: SHIPPED | OUTPATIENT
Start: 2023-02-07 | End: 2024-02-07

## 2023-02-07 RX ORDER — FLUOCINONIDE TOPICAL SOLUTION USP, 0.05% 0.5 MG/ML
SOLUTION TOPICAL
Qty: 60 ML | Refills: 3 | Status: SHIPPED | OUTPATIENT
Start: 2023-02-07

## 2023-02-07 RX ORDER — TRETINOIN 0.5 MG/G
CREAM TOPICAL NIGHTLY
Qty: 45 G | Refills: 11 | Status: SHIPPED | OUTPATIENT
Start: 2023-02-07

## 2023-02-07 RX ORDER — KETOCONAZOLE 20 MG/ML
SHAMPOO, SUSPENSION TOPICAL WEEKLY
Qty: 120 ML | Refills: 5 | Status: SHIPPED | OUTPATIENT
Start: 2023-02-07

## 2023-02-07 NOTE — PROGRESS NOTES
Patient Information  Name: Megan Estrada  : 1995  MRN: [id number]     Referring Physician:  Dr. Wagner   Primary Care Physician:  Dr. Sabrina Wagner MD   Date of Visit: 2023      Subjective:       Megan Estrada is a 27 y.o. female who presents for   Chief Complaint   Patient presents with    Acne     C/o of acne scarring     HPI  Patient with new complaint of acne    Currently on topical tretinoin 0.05% and spironolactone 25 mg daily with good control. Interested in treatment of scars.    Patient with new complaint of lesion(s)  Location: scalp  Duration: 6 months  Symptoms: dry flakes  Relieving factors/Previous treatments: OTC products      Patient was last seen:Visit date not found     Prior notes by myself reviewed.   Clinical documentation obtained by nursing staff reviewed.    Review of Systems   Skin:  Negative for itching and rash.      Objective:    Physical Exam   Constitutional: She appears well-developed and well-nourished. No distress.   Neurological: She is alert and oriented to person, place, and time. She is not disoriented.   Psychiatric: She has a normal mood and affect.   Skin:   Areas Examined (abnormalities noted in diagram):   Scalp / Hair Palpated and Inspected  Head / Face Inspection Performed            Diagram Legend     Erythematous scaling macule/papule c/w actinic keratosis       Vascular papule c/w angioma      Pigmented verrucoid papule/plaque c/w seborrheic keratosis      Yellow umbilicated papule c/w sebaceous hyperplasia      Irregularly shaped tan macule c/w lentigo     1-2 mm smooth white papules consistent with Milia      Movable subcutaneous cyst with punctum c/w epidermal inclusion cyst      Subcutaneous movable cyst c/w pilar cyst      Firm pink to brown papule c/w dermatofibroma      Pedunculated fleshy papule(s) c/w skin tag(s)      Evenly pigmented macule c/w junctional nevus     Mildly variegated pigmented, slightly irregular-bordered macule c/w mildly atypical  nevus      Flesh colored to evenly pigmented papule c/w intradermal nevus       Pink pearly papule/plaque c/w basal cell carcinoma      Erythematous hyperkeratotic cursted plaque c/w SCC      Surgical scar with no sign of skin cancer recurrence      Open and closed comedones      Inflammatory papules and pustules      Verrucoid papule consistent consistent with wart     Erythematous eczematous patches and plaques     Dystrophic onycholytic nail with subungual debris c/w onychomycosis     Umbilicated papule    Erythematous-base heme-crusted tan verrucoid plaque consistent with inflamed seborrheic keratosis     Erythematous Silvery Scaling Plaque c/w Psoriasis     See annotation      No images are attached to the encounter or orders placed in the encounter.    [] Data reviewed  [] Independent review of test  [] Management discussed with another provider    Assessment / Plan:        Seborrheic dermatitis  -     ketoconazole (NIZORAL) 2 % shampoo; Apply topically once a week. Lather in for 5-10 min before rinsing  Dispense: 120 mL; Refill: 5  -     fluocinonide (LIDEX) 0.05 % external solution; AAA scalp qday - bid prn pruritus  Dispense: 60 mL; Refill: 3  Counseling on topical steroids:  Patient counseled that the prolonged use of topical steroids can result in the increased appearance superficial blood vessels (telangiectasias) lightening (hypopigmentation), and   thinning of the skin ( atrophy).  Patient understands to avoid using high potency steroids in skin folds, the groin or the face.  The patient verbalized understanding of proper use and possible adverse effects of topical steroids.  All patient's questions and concerns were addressed.    Acne vulgaris, chronic stable  -     tretinoin (RETIN-A) 0.05 % cream; Apply topically every evening.  Dispense: 45 g; Refill: 11  -     spironolactone (ALDACTONE) 50 MG tablet; Take 1 tablet (50 mg total) by mouth once daily.  Dispense: 90 tablet; Refill: 3  - Recommend  microneedling for acne scars             LOS NUMBER AND COMPLEXITY OF PROBLEMS    COMPLEXITY OF DATA RISK TOTAL TIME (m)   05264  12604 [] 1 self-limited or minor problem [x] Minimal to none [] No treatment recommended or patient to monitor 15-29  10-19   19790  37529 Low  [] 2 or > self limited or minor problems  [x] 1 stable chronic illness  [] 1 acute, uncomplicated illness or injury Limited (2)  [] Prior external notes from each unique source  [] Review result of each unique test  [] Order each unique test []  Low  OTC medications, minor skin biopsy 30-44 20-29   97549  40370 Moderate  [x]  1 or > chronic illness with progression, exacerbation or SE of treatment  []  2 or more stable chronic illnesses  []  1 acute illness with systemic symptoms  []  1 acute complicated injury  []  1 undiagnosed new problem with uncertain prognosis Moderate (1/3 below)  []  3 or more data items        *Now includes assessment requiring independent historian  []  Independent interpretation of a test  []  Discuss management/test with another provider Moderate  [x]  Prescription drug mgmt  []  Minor surgery with risk discussed  []  Mgmt limited by social determinates 45-59  30-39   90099  80173 High  []  1 or more chronic illness with severe exacerbation, progression or SE of treatment  []  1 acute or chronic illness/injury that poses a threat to life or bodily function Extensive (2/3 below)  []  3 or more data items        *Now includes assessment requiring independent historian.  []  Independent interpretation of a test  []  Discuss management/test with another provider High  []  Major surgery with risk discussed  []  Drug therapy requiring intensive monitoring for toxicity  []  Hospitalization  []  Decision for DNR 60-74  40-54      No follow-ups on file.    Therese Cooepr MD, FAAD  [facility]

## 2023-03-03 ENCOUNTER — PATIENT MESSAGE (OUTPATIENT)
Dept: INTERNAL MEDICINE | Facility: CLINIC | Age: 28
End: 2023-03-03
Payer: COMMERCIAL

## 2023-03-15 ENCOUNTER — PATIENT MESSAGE (OUTPATIENT)
Dept: INTERNAL MEDICINE | Facility: CLINIC | Age: 28
End: 2023-03-15
Payer: COMMERCIAL

## 2023-03-22 ENCOUNTER — OFFICE VISIT (OUTPATIENT)
Dept: INTERNAL MEDICINE | Facility: CLINIC | Age: 28
End: 2023-03-22
Payer: COMMERCIAL

## 2023-03-22 ENCOUNTER — LAB VISIT (OUTPATIENT)
Dept: LAB | Facility: HOSPITAL | Age: 28
End: 2023-03-22
Attending: FAMILY MEDICINE
Payer: COMMERCIAL

## 2023-03-22 VITALS
SYSTOLIC BLOOD PRESSURE: 102 MMHG | HEART RATE: 89 BPM | OXYGEN SATURATION: 98 % | DIASTOLIC BLOOD PRESSURE: 62 MMHG | HEIGHT: 66 IN | BODY MASS INDEX: 20.62 KG/M2 | TEMPERATURE: 98 F | WEIGHT: 128.31 LBS

## 2023-03-22 DIAGNOSIS — N83.202 LEFT OVARIAN CYST: ICD-10-CM

## 2023-03-22 DIAGNOSIS — M25.60 JOINT STIFFNESS: ICD-10-CM

## 2023-03-22 DIAGNOSIS — R53.83 FATIGUE, UNSPECIFIED TYPE: ICD-10-CM

## 2023-03-22 DIAGNOSIS — M25.59 PAIN IN OTHER JOINT: ICD-10-CM

## 2023-03-22 DIAGNOSIS — L28.2 PRURITIC RASH: Primary | ICD-10-CM

## 2023-03-22 LAB — ERYTHROCYTE [SEDIMENTATION RATE] IN BLOOD BY PHOTOMETRIC METHOD: 3 MM/HR (ref 0–36)

## 2023-03-22 PROCEDURE — 3074F SYST BP LT 130 MM HG: CPT | Mod: CPTII,S$GLB,, | Performed by: FAMILY MEDICINE

## 2023-03-22 PROCEDURE — 86431 RHEUMATOID FACTOR QUANT: CPT | Performed by: FAMILY MEDICINE

## 2023-03-22 PROCEDURE — 85025 COMPLETE CBC W/AUTO DIFF WBC: CPT | Performed by: FAMILY MEDICINE

## 2023-03-22 PROCEDURE — 36415 COLL VENOUS BLD VENIPUNCTURE: CPT | Performed by: FAMILY MEDICINE

## 2023-03-22 PROCEDURE — 86200 CCP ANTIBODY: CPT | Performed by: FAMILY MEDICINE

## 2023-03-22 PROCEDURE — 3078F DIAST BP <80 MM HG: CPT | Mod: CPTII,S$GLB,, | Performed by: FAMILY MEDICINE

## 2023-03-22 PROCEDURE — 84439 ASSAY OF FREE THYROXINE: CPT | Performed by: FAMILY MEDICINE

## 2023-03-22 PROCEDURE — 1159F PR MEDICATION LIST DOCUMENTED IN MEDICAL RECORD: ICD-10-PCS | Mod: CPTII,S$GLB,, | Performed by: FAMILY MEDICINE

## 2023-03-22 PROCEDURE — 86038 ANTINUCLEAR ANTIBODIES: CPT | Performed by: FAMILY MEDICINE

## 2023-03-22 PROCEDURE — 99999 PR PBB SHADOW E&M-EST. PATIENT-LVL IV: CPT | Mod: PBBFAC,,, | Performed by: FAMILY MEDICINE

## 2023-03-22 PROCEDURE — 86039 ANTINUCLEAR ANTIBODIES (ANA): CPT | Performed by: FAMILY MEDICINE

## 2023-03-22 PROCEDURE — 86140 C-REACTIVE PROTEIN: CPT | Performed by: FAMILY MEDICINE

## 2023-03-22 PROCEDURE — 80053 COMPREHEN METABOLIC PANEL: CPT | Performed by: FAMILY MEDICINE

## 2023-03-22 PROCEDURE — 3008F PR BODY MASS INDEX (BMI) DOCUMENTED: ICD-10-PCS | Mod: CPTII,S$GLB,, | Performed by: FAMILY MEDICINE

## 2023-03-22 PROCEDURE — 99999 PR PBB SHADOW E&M-EST. PATIENT-LVL IV: ICD-10-PCS | Mod: PBBFAC,,, | Performed by: FAMILY MEDICINE

## 2023-03-22 PROCEDURE — 99214 PR OFFICE/OUTPT VISIT, EST, LEVL IV, 30-39 MIN: ICD-10-PCS | Mod: S$GLB,,, | Performed by: FAMILY MEDICINE

## 2023-03-22 PROCEDURE — 3008F BODY MASS INDEX DOCD: CPT | Mod: CPTII,S$GLB,, | Performed by: FAMILY MEDICINE

## 2023-03-22 PROCEDURE — 3078F PR MOST RECENT DIASTOLIC BLOOD PRESSURE < 80 MM HG: ICD-10-PCS | Mod: CPTII,S$GLB,, | Performed by: FAMILY MEDICINE

## 2023-03-22 PROCEDURE — 99214 OFFICE O/P EST MOD 30 MIN: CPT | Mod: S$GLB,,, | Performed by: FAMILY MEDICINE

## 2023-03-22 PROCEDURE — 3074F PR MOST RECENT SYSTOLIC BLOOD PRESSURE < 130 MM HG: ICD-10-PCS | Mod: CPTII,S$GLB,, | Performed by: FAMILY MEDICINE

## 2023-03-22 PROCEDURE — 84443 ASSAY THYROID STIM HORMONE: CPT | Performed by: FAMILY MEDICINE

## 2023-03-22 PROCEDURE — 1159F MED LIST DOCD IN RCRD: CPT | Mod: CPTII,S$GLB,, | Performed by: FAMILY MEDICINE

## 2023-03-22 PROCEDURE — 85652 RBC SED RATE AUTOMATED: CPT | Performed by: FAMILY MEDICINE

## 2023-03-22 RX ORDER — PREDNISONE 20 MG/1
40 TABLET ORAL DAILY
Qty: 10 TABLET | Refills: 0 | Status: SHIPPED | OUTPATIENT
Start: 2023-03-22 | End: 2023-04-12

## 2023-03-22 RX ORDER — FLUCONAZOLE 150 MG/1
150 TABLET ORAL
Qty: 2 TABLET | Refills: 0 | Status: SHIPPED | OUTPATIENT
Start: 2023-03-22 | End: 2023-04-12

## 2023-03-22 NOTE — PROGRESS NOTES
Subjective:      Patient ID: Megan Estrada is a 27 y.o. female.    Chief Complaint: Follow-up and Rash    Noticed a rash on Sunday on chest.   No new soaps or detergnets.   No rash on any other areas.     Had bad sunburn in the area of her chest.     Had diagnosed with HPP  Potentially going to try strensiq.   Planning on starting B6 vitamin supplement  Having a lot of joint pain.     Was restarted on trileptal.   Has helped somewhat with ADHD type symptoms  Restarted it because she was getting angry and lashing off. Also was bottling stuff up.       The patient's Health Maintenance was reviewed and the following appears to be due at this time:   Health Maintenance Due   Topic Date Due    Hepatitis C Screening  Never done    HIV Screening  Never done    TETANUS VACCINE  Never done    Pap Smear  Never done    COVID-19 Vaccine (4 - Booster for Pfizer series) 02/02/2022        Past Medical History:  Past Medical History:   Diagnosis Date    Abnormal Pap smear of cervix     Bipolar disorder     Generalized anxiety disorder     Obsessive-compulsive disorder      Past Surgical History:   Procedure Laterality Date    CERVICAL BIOPSY  W/ LOOP ELECTRODE EXCISION      lipo on chin       Review of patient's allergies indicates:   Allergen Reactions    Adhesive     Latex      Social History     Socioeconomic History    Marital status: Single   Occupational History     Comment: [employer]   Tobacco Use    Smoking status: Never    Smokeless tobacco: Never   Substance and Sexual Activity    Alcohol use: Not Currently    Drug use: Yes     Types: Marijuana     Comment: occasional     Family History   Problem Relation Age of Onset    Bipolar disorder Father        Review of Systems   Constitutional:  Negative for chills and fever.   Respiratory:  Negative for cough and shortness of breath.    Cardiovascular:  Negative for chest pain.   Gastrointestinal:  Positive for abdominal pain. Negative for abdominal distention, nausea and  "vomiting.   Genitourinary:  Positive for pelvic pain.   Musculoskeletal:  Positive for arthralgias and joint swelling.      Objective:   /62 (BP Location: Left arm, Patient Position: Sitting, BP Method: Medium (Manual))   Pulse 89   Temp 97.7 °F (36.5 °C) (Tympanic)   Ht 5' 6" (1.676 m)   Wt 58.2 kg (128 lb 4.9 oz)   SpO2 98%   BMI 20.71 kg/m²     Physical Exam  Vitals and nursing note reviewed.   Constitutional:       Appearance: Normal appearance.   HENT:      Head: Normocephalic.   Eyes:      Conjunctiva/sclera: Conjunctivae normal.   Cardiovascular:      Rate and Rhythm: Normal rate and regular rhythm.   Pulmonary:      Effort: Pulmonary effort is normal.      Breath sounds: Normal breath sounds.   Musculoskeletal:         General: No deformity.      Right lower leg: No edema.      Left lower leg: No edema.   Skin:     General: Skin is warm and dry.      Findings: Rash (erythematous rash on chest; nonblanchable, no vesicles, no discharge) present.   Neurological:      Mental Status: She is alert and oriented to person, place, and time. Mental status is at baseline.   Psychiatric:         Mood and Affect: Mood normal.         Behavior: Behavior normal.         Thought Content: Thought content normal.       1. Pruritic rash    2. Pain in other joint    3. Joint stiffness    4. Fatigue, unspecified type    5. Left ovarian cyst      Plan:       1. Pruritic rash  Comments:  recommend pt starting H2 blocker and H1 blocker; short course of steroids; advised pt it does not look like shingles; provided follow up precautions  Orders:  -     predniSONE (DELTASONE) 20 MG tablet; Take 2 tablets (40 mg total) by mouth once daily. for 5 days  Dispense: 10 tablet; Refill: 0  -     fluconazole (DIFLUCAN) 150 MG Tab; Take 1 tablet (150 mg total) by mouth every 48 hours as needed (yeast).  Dispense: 2 tablet; Refill: 0    2. Pain in other joint  Comments:  checking for autoimmune etiologies   Orders:  -     ROBERTO by IFA, " w/Rflx; Future; Expected date: 03/22/2023  -     RHEUMATOID FACTOR; Future; Expected date: 03/22/2023  -     CYCLIC CITRUL PEPTIDE ANTIBODY, IGG; Future; Expected date: 03/22/2023  -     Sedimentation rate; Future; Expected date: 03/22/2023  -     C-REACTIVE PROTEIN; Future; Expected date: 03/22/2023    3. Joint stiffness  -     ROBERTO by IFA, w/Rflx; Future; Expected date: 03/22/2023  -     RHEUMATOID FACTOR; Future; Expected date: 03/22/2023  -     CYCLIC CITRUL PEPTIDE ANTIBODY, IGG; Future; Expected date: 03/22/2023  -     Sedimentation rate; Future; Expected date: 03/22/2023  -     C-REACTIVE PROTEIN; Future; Expected date: 03/22/2023  -     CBC Auto Differential; Future; Expected date: 03/22/2023    4. Fatigue, unspecified type  Comments:  checking for autoimmune etiologies   Orders:  -     Comprehensive Metabolic Panel; Future; Expected date: 03/22/2023  -     TSH; Future; Expected date: 03/22/2023  -     T4, Free; Future; Expected date: 03/22/2023  -     CBC Auto Differential; Future; Expected date: 03/22/2023    5. Left ovarian cyst  Comments:  will repeat US and follow up with results  Orders:  -     US Pelvis Complete Non OB; Future; Expected date: 03/22/2023         Medication List with Changes/Refills   Current Medications    ERENUMAB-AOOE (AIMOVIG AUTOINJECTOR) 70 MG/ML AUTOINJECTOR    Inject 70 mg into the skin.    FLUOCINONIDE (LIDEX) 0.05 % EXTERNAL SOLUTION    AAA scalp qday - bid prn pruritus    FLUTICASONE PROPIONATE (FLONASE) 50 MCG/ACTUATION NASAL SPRAY    1 spray (50 mcg total) by Each Nostril route once daily.    HYDROXYZINE PAMOATE (VISTARIL) 25 MG CAP    Take 1-2 tablets by mouth every 8 hours as needed for anxiety.    KETOCONAZOLE (NIZORAL) 2 % SHAMPOO    Apply topically once a week. Lather in for 5-10 min before rinsing    LINZESS 145 MCG CAP CAPSULE    TAKE 1 CAPSULE BY MOUTH EVERY DAY 30 MINUTES BEFORE FIRST MEAL OF THE DAY ON AN EMPTY STOMACH    OXCARBAZEPINE (TRILEPTAL) 300 MG TAB     Take 300 mg by mouth once daily.    PAROXETINE (PAXIL) 40 MG TABLET    TK 1 T PO QD IN THE MORNING    SPIRONOLACTONE (ALDACTONE) 50 MG TABLET    Take 1 tablet (50 mg total) by mouth once daily.    TRAZODONE (DESYREL) 100 MG TABLET    Take 100 mg by mouth every evening.    TRETINOIN (RETIN-A) 0.05 % CREAM    Apply topically every evening.    TRI-LO-SPRINTEC 0.18/0.215/0.25 MG-25 MCG TABLET    TK 1 T PO QD    UBROGEPANT (UBRELVY) 100 MG TABLET    Take by mouth.   Discontinued Medications    FLUCONAZOLE (DIFLUCAN) 150 MG TAB    TAKE 1 TABLET BY MOUTH ONNCE                Follow up in about 3 weeks (around 4/12/2023) for F/U , Virtual Visit.

## 2023-03-23 LAB
ALBUMIN SERPL BCP-MCNC: 4 G/DL (ref 3.5–5.2)
ALP SERPL-CCNC: 42 U/L (ref 55–135)
ALT SERPL W/O P-5'-P-CCNC: 22 U/L (ref 10–44)
ANION GAP SERPL CALC-SCNC: 8 MMOL/L (ref 8–16)
AST SERPL-CCNC: 18 U/L (ref 10–40)
BASOPHILS # BLD AUTO: 0.03 K/UL (ref 0–0.2)
BASOPHILS NFR BLD: 0.4 % (ref 0–1.9)
BILIRUB SERPL-MCNC: 0.2 MG/DL (ref 0.1–1)
BUN SERPL-MCNC: 10 MG/DL (ref 6–20)
CALCIUM SERPL-MCNC: 9.2 MG/DL (ref 8.7–10.5)
CCP AB SER IA-ACNC: 0.9 U/ML
CHLORIDE SERPL-SCNC: 104 MMOL/L (ref 95–110)
CO2 SERPL-SCNC: 26 MMOL/L (ref 23–29)
CREAT SERPL-MCNC: 0.7 MG/DL (ref 0.5–1.4)
CRP SERPL-MCNC: 2.3 MG/L (ref 0–8.2)
DIFFERENTIAL METHOD: NORMAL
EOSINOPHIL # BLD AUTO: 0.1 K/UL (ref 0–0.5)
EOSINOPHIL NFR BLD: 1.4 % (ref 0–8)
ERYTHROCYTE [DISTWIDTH] IN BLOOD BY AUTOMATED COUNT: 12.4 % (ref 11.5–14.5)
EST. GFR  (NO RACE VARIABLE): >60 ML/MIN/1.73 M^2
GLUCOSE SERPL-MCNC: 80 MG/DL (ref 70–110)
HCT VFR BLD AUTO: 37.4 % (ref 37–48.5)
HGB BLD-MCNC: 12.1 G/DL (ref 12–16)
IMM GRANULOCYTES # BLD AUTO: 0.01 K/UL (ref 0–0.04)
IMM GRANULOCYTES NFR BLD AUTO: 0.1 % (ref 0–0.5)
LYMPHOCYTES # BLD AUTO: 2.8 K/UL (ref 1–4.8)
LYMPHOCYTES NFR BLD: 39.6 % (ref 18–48)
MCH RBC QN AUTO: 30.2 PG (ref 27–31)
MCHC RBC AUTO-ENTMCNC: 32.4 G/DL (ref 32–36)
MCV RBC AUTO: 93 FL (ref 82–98)
MONOCYTES # BLD AUTO: 0.5 K/UL (ref 0.3–1)
MONOCYTES NFR BLD: 6.4 % (ref 4–15)
NEUTROPHILS # BLD AUTO: 3.7 K/UL (ref 1.8–7.7)
NEUTROPHILS NFR BLD: 52.1 % (ref 38–73)
NRBC BLD-RTO: 0 /100 WBC
PLATELET # BLD AUTO: 226 K/UL (ref 150–450)
PMV BLD AUTO: 11.3 FL (ref 9.2–12.9)
POTASSIUM SERPL-SCNC: 3.7 MMOL/L (ref 3.5–5.1)
PROT SERPL-MCNC: 6.9 G/DL (ref 6–8.4)
RBC # BLD AUTO: 4.01 M/UL (ref 4–5.4)
RHEUMATOID FACT SERPL-ACNC: <13 IU/ML (ref 0–15)
SODIUM SERPL-SCNC: 138 MMOL/L (ref 136–145)
T4 FREE SERPL-MCNC: 0.78 NG/DL (ref 0.71–1.51)
TSH SERPL DL<=0.005 MIU/L-ACNC: 0.71 UIU/ML (ref 0.4–4)
WBC # BLD AUTO: 7.04 K/UL (ref 3.9–12.7)

## 2023-03-24 LAB
ANA PATTERN 1: NORMAL
ANA SER-ACNC: POSITIVE
ANA TITR SER IF: NORMAL {TITER}

## 2023-03-27 ENCOUNTER — TELEPHONE (OUTPATIENT)
Dept: INTERNAL MEDICINE | Facility: CLINIC | Age: 28
End: 2023-03-27
Payer: COMMERCIAL

## 2023-03-27 ENCOUNTER — PATIENT MESSAGE (OUTPATIENT)
Dept: INTERNAL MEDICINE | Facility: CLINIC | Age: 28
End: 2023-03-27
Payer: COMMERCIAL

## 2023-03-27 DIAGNOSIS — R76.8 POSITIVE ANA (ANTINUCLEAR ANTIBODY): Primary | ICD-10-CM

## 2023-03-28 ENCOUNTER — LAB VISIT (OUTPATIENT)
Dept: LAB | Facility: HOSPITAL | Age: 28
End: 2023-03-28
Payer: COMMERCIAL

## 2023-03-28 DIAGNOSIS — R76.8 POSITIVE ANA (ANTINUCLEAR ANTIBODY): ICD-10-CM

## 2023-03-28 PROCEDURE — 86235 NUCLEAR ANTIGEN ANTIBODY: CPT | Mod: 59 | Performed by: FAMILY MEDICINE

## 2023-03-28 PROCEDURE — 36415 COLL VENOUS BLD VENIPUNCTURE: CPT | Performed by: FAMILY MEDICINE

## 2023-03-28 PROCEDURE — 86256 FLUORESCENT ANTIBODY TITER: CPT | Performed by: FAMILY MEDICINE

## 2023-03-28 PROCEDURE — 86225 DNA ANTIBODY NATIVE: CPT | Performed by: FAMILY MEDICINE

## 2023-03-28 PROCEDURE — 86235 NUCLEAR ANTIGEN ANTIBODY: CPT | Performed by: FAMILY MEDICINE

## 2023-03-29 ENCOUNTER — HOSPITAL ENCOUNTER (OUTPATIENT)
Dept: RADIOLOGY | Facility: HOSPITAL | Age: 28
Discharge: HOME | End: 2023-03-29
Attending: FAMILY MEDICINE
Payer: COMMERCIAL

## 2023-03-29 DIAGNOSIS — N83.202 LEFT OVARIAN CYST: ICD-10-CM

## 2023-03-29 LAB — DSDNA AB SER-ACNC: NORMAL [IU]/ML

## 2023-03-29 PROCEDURE — 76830 TRANSVAGINAL US NON-OB: CPT | Mod: 26,,, | Performed by: RADIOLOGY

## 2023-03-29 PROCEDURE — 76856 US PELVIS COMP WITH TRANSVAG NON-OB (XPD): ICD-10-PCS | Mod: 26,,, | Performed by: RADIOLOGY

## 2023-03-29 PROCEDURE — 76830 US PELVIS COMP WITH TRANSVAG NON-OB (XPD): ICD-10-PCS | Mod: 26,,, | Performed by: RADIOLOGY

## 2023-03-29 PROCEDURE — 76856 US EXAM PELVIC COMPLETE: CPT | Mod: 26,,, | Performed by: RADIOLOGY

## 2023-03-29 PROCEDURE — 76856 US EXAM PELVIC COMPLETE: CPT | Mod: TC

## 2023-03-30 LAB
ANTI SM/RNP ANTIBODY: 0.12 RATIO (ref 0–0.99)
ANTI-SM/RNP INTERPRETATION: NEGATIVE
ANTI-SSA ANTIBODY: 0.23 RATIO (ref 0–0.99)
ANTI-SSA INTERPRETATION: NEGATIVE
ANTI-SSB ANTIBODY: 0.07 RATIO (ref 0–0.99)
ANTI-SSB INTERPRETATION: NEGATIVE
ENA JO1 IGG SER-ACNC: <0.2 U

## 2023-03-31 LAB
ANTI-CENTROMERE ANTIBODY: <0.4 U/ML
ENA SCL70 AB SER-ACNC: <0.6 U/ML

## 2023-04-12 ENCOUNTER — OFFICE VISIT (OUTPATIENT)
Dept: INTERNAL MEDICINE | Facility: CLINIC | Age: 28
End: 2023-04-12
Payer: COMMERCIAL

## 2023-04-12 DIAGNOSIS — R76.8 POSITIVE ANA (ANTINUCLEAR ANTIBODY): ICD-10-CM

## 2023-04-12 DIAGNOSIS — K59.04 CHRONIC IDIOPATHIC CONSTIPATION: Primary | ICD-10-CM

## 2023-04-12 PROCEDURE — 99213 OFFICE O/P EST LOW 20 MIN: CPT | Mod: 95,,, | Performed by: FAMILY MEDICINE

## 2023-04-12 PROCEDURE — 99213 PR OFFICE/OUTPT VISIT, EST, LEVL III, 20-29 MIN: ICD-10-PCS | Mod: 95,,, | Performed by: FAMILY MEDICINE

## 2023-04-18 ENCOUNTER — PATIENT MESSAGE (OUTPATIENT)
Dept: ENDOCRINOLOGY | Facility: CLINIC | Age: 28
End: 2023-04-18
Payer: COMMERCIAL

## 2023-04-18 DIAGNOSIS — R74.8 LOW SERUM ALKALINE PHOSPHATASE: Primary | ICD-10-CM

## 2023-04-18 DIAGNOSIS — E83.39 HYPOPHOSPHATASIA: ICD-10-CM

## 2023-04-20 ENCOUNTER — OFFICE VISIT (OUTPATIENT)
Dept: RHEUMATOLOGY | Facility: CLINIC | Age: 28
End: 2023-04-20
Payer: COMMERCIAL

## 2023-04-20 VITALS
BODY MASS INDEX: 20.55 KG/M2 | HEART RATE: 69 BPM | DIASTOLIC BLOOD PRESSURE: 74 MMHG | SYSTOLIC BLOOD PRESSURE: 113 MMHG | HEIGHT: 66 IN | WEIGHT: 127.88 LBS

## 2023-04-20 DIAGNOSIS — M54.50 CHRONIC BILATERAL LOW BACK PAIN WITHOUT SCIATICA: ICD-10-CM

## 2023-04-20 DIAGNOSIS — R76.8 POSITIVE ANA (ANTINUCLEAR ANTIBODY): ICD-10-CM

## 2023-04-20 DIAGNOSIS — M79.18 MYOFASCIAL PAIN: Primary | ICD-10-CM

## 2023-04-20 DIAGNOSIS — G89.29 CHRONIC BILATERAL LOW BACK PAIN WITHOUT SCIATICA: ICD-10-CM

## 2023-04-20 PROCEDURE — 3008F BODY MASS INDEX DOCD: CPT | Mod: CPTII,S$GLB,, | Performed by: PHYSICIAN ASSISTANT

## 2023-04-20 PROCEDURE — 1159F MED LIST DOCD IN RCRD: CPT | Mod: CPTII,S$GLB,, | Performed by: PHYSICIAN ASSISTANT

## 2023-04-20 PROCEDURE — 3078F PR MOST RECENT DIASTOLIC BLOOD PRESSURE < 80 MM HG: ICD-10-PCS | Mod: CPTII,S$GLB,, | Performed by: PHYSICIAN ASSISTANT

## 2023-04-20 PROCEDURE — 1160F PR REVIEW ALL MEDS BY PRESCRIBER/CLIN PHARMACIST DOCUMENTED: ICD-10-PCS | Mod: CPTII,S$GLB,, | Performed by: PHYSICIAN ASSISTANT

## 2023-04-20 PROCEDURE — 99204 PR OFFICE/OUTPT VISIT, NEW, LEVL IV, 45-59 MIN: ICD-10-PCS | Mod: S$GLB,,, | Performed by: PHYSICIAN ASSISTANT

## 2023-04-20 PROCEDURE — 1160F RVW MEDS BY RX/DR IN RCRD: CPT | Mod: CPTII,S$GLB,, | Performed by: PHYSICIAN ASSISTANT

## 2023-04-20 PROCEDURE — 99999 PR PBB SHADOW E&M-EST. PATIENT-LVL V: ICD-10-PCS | Mod: PBBFAC,,, | Performed by: PHYSICIAN ASSISTANT

## 2023-04-20 PROCEDURE — 99999 PR PBB SHADOW E&M-EST. PATIENT-LVL V: CPT | Mod: PBBFAC,,, | Performed by: PHYSICIAN ASSISTANT

## 2023-04-20 PROCEDURE — 3074F SYST BP LT 130 MM HG: CPT | Mod: CPTII,S$GLB,, | Performed by: PHYSICIAN ASSISTANT

## 2023-04-20 PROCEDURE — 3008F PR BODY MASS INDEX (BMI) DOCUMENTED: ICD-10-PCS | Mod: CPTII,S$GLB,, | Performed by: PHYSICIAN ASSISTANT

## 2023-04-20 PROCEDURE — 99204 OFFICE O/P NEW MOD 45 MIN: CPT | Mod: S$GLB,,, | Performed by: PHYSICIAN ASSISTANT

## 2023-04-20 PROCEDURE — 3078F DIAST BP <80 MM HG: CPT | Mod: CPTII,S$GLB,, | Performed by: PHYSICIAN ASSISTANT

## 2023-04-20 PROCEDURE — 1159F PR MEDICATION LIST DOCUMENTED IN MEDICAL RECORD: ICD-10-PCS | Mod: CPTII,S$GLB,, | Performed by: PHYSICIAN ASSISTANT

## 2023-04-20 PROCEDURE — 3074F PR MOST RECENT SYSTOLIC BLOOD PRESSURE < 130 MM HG: ICD-10-PCS | Mod: CPTII,S$GLB,, | Performed by: PHYSICIAN ASSISTANT

## 2023-04-20 RX ORDER — METHOCARBAMOL 500 MG/1
500 TABLET, FILM COATED ORAL 3 TIMES DAILY PRN
Qty: 30 TABLET | Refills: 1 | Status: SHIPPED | OUTPATIENT
Start: 2023-04-20 | End: 2023-06-08 | Stop reason: SDUPTHER

## 2023-04-20 RX ORDER — MELOXICAM 15 MG/1
15 TABLET ORAL DAILY
Qty: 30 TABLET | Refills: 1 | Status: SHIPPED | OUTPATIENT
Start: 2023-04-20 | End: 2023-04-20

## 2023-04-20 NOTE — PROGRESS NOTES
Subjective:      Patient ID: Megan Estrada is a 27 y.o. female.    Chief Complaint: Positive ROBERTO      HPI   Megan Estrada  is a 27 y.o. female referred by primary care for evaluation poly myalgias.  She complains of pain in the upper thoracic, her back her neck chronically but worsening over the last 3 years.  She also noticed a rash on her chest about 1 month ago.  She did 40 mg of prednisone for 5 days in addition to Zantac and Claritin.  Within about 2-3 days it had improved.  She was noted to have a positive ROBERTO by primary care.  The titer 160 with a homogeneous pattern.    She has had blood in her urine in the past.  She sees Urology.  They were not concerned.  She has a 1st cousin on her mom's side with rheumatoid arthritis.  Her grandmother had MS.      Patient denies fevers, chills, photosensitivity, eye pain, shortness of breath, chest pain, hematuria, blood in the stool, sicca symptoms, raynauds, finger ulcerations.  Rheumatologic systems otherwise negative.    Serologies/Labs:  Neg anti Scl 70, anti PARKER-1, anti sm/rnp, ssa, ssb, ds dna  ESR/CRP wnl  Neg CCP, RF,  + ROBERTO 1:160 homogenous  Current Treatment:  Na  Previous Treatment:   na      Current Outpatient Medications:     erenumab-aooe (AIMOVIG AUTOINJECTOR) 70 mg/mL autoinjector, Inject 70 mg into the skin., Disp: , Rfl:     fluocinonide (LIDEX) 0.05 % external solution, AAA scalp qday - bid prn pruritus, Disp: 60 mL, Rfl: 3    fluticasone propionate (FLONASE) 50 mcg/actuation nasal spray, 1 spray (50 mcg total) by Each Nostril route once daily., Disp: 16 g, Rfl: 2    ketoconazole (NIZORAL) 2 % shampoo, Apply topically once a week. Lather in for 5-10 min before rinsing, Disp: 120 mL, Rfl: 5    linaCLOtide (LINZESS) 72 mcg Cap capsule, Take 1 capsule (72 mcg total) by mouth before breakfast., Disp: 90 capsule, Rfl: 1    OXcarbazepine (TRILEPTAL) 300 MG Tab, Take 300 mg by mouth once daily., Disp: , Rfl:     paroxetine (PAXIL) 40 MG tablet, TK 1 T PO QD IN  "THE MORNING, Disp: , Rfl:     spironolactone (ALDACTONE) 50 MG tablet, Take 1 tablet (50 mg total) by mouth once daily., Disp: 90 tablet, Rfl: 3    traZODone (DESYREL) 100 MG tablet, Take 100 mg by mouth every evening., Disp: , Rfl:     tretinoin (RETIN-A) 0.05 % cream, Apply topically every evening., Disp: 45 g, Rfl: 11    TRI-LO-SPRINTEC 0.18/0.215/0.25 mg-25 mcg tablet, TK 1 T PO QD, Disp: , Rfl:     ubrogepant (UBRELVY) 100 mg tablet, Take by mouth., Disp: , Rfl:     meloxicam (MOBIC) 15 MG tablet, TAKE 1 TABLET(15 MG) BY MOUTH ONCE DAILY, Disp: 90 tablet, Rfl: 0    methocarbamoL (ROBAXIN) 500 MG Tab, Take 1 tablet (500 mg total) by mouth 3 (three) times daily as needed (muscle spasms)., Disp: 30 tablet, Rfl: 1    Past Medical History:   Diagnosis Date    Abnormal Pap smear of cervix     Bipolar disorder     Generalized anxiety disorder     Obsessive-compulsive disorder      Family History   Problem Relation Age of Onset    Bipolar disorder Father      Social History     Socioeconomic History    Marital status: Single   Occupational History     Comment: [employer]   Tobacco Use    Smoking status: Never    Smokeless tobacco: Never   Substance and Sexual Activity    Alcohol use: Not Currently    Drug use: Yes     Types: Marijuana     Comment: occasional     Review of patient's allergies indicates:   Allergen Reactions    Adhesive     Latex        Objective:   /74   Pulse 69   Ht 5' 6" (1.676 m)   Wt 58 kg (127 lb 13.9 oz)   BMI 20.64 kg/m²   Immunization History   Administered Date(s) Administered    COVID-19, MRNA, LN-S, PF (Pfizer) (Purple Cap) 04/01/2021, 04/21/2021, 12/08/2021       Physical Exam   Constitutional: She is oriented to person, place, and time. No distress.   HENT:   Head: Normocephalic and atraumatic.   Pulmonary/Chest: Effort normal.   Abdominal: She exhibits no distension.   Musculoskeletal:         General: No swelling or tenderness. Normal range of motion.      Cervical back: " Normal range of motion.   Lymphadenopathy:     She has no cervical adenopathy.   Neurological: She is alert and oriented to person, place, and time.   Skin: Skin is warm and dry. No rash noted.   Psychiatric: Mood normal.   Nursing note and vitals reviewed.    No active synovitis, enthesitis, dactylitis  12/18 tender trigger points  Sign ttp over the SI joints and in the myofascial region  NVI BUE and BLE  ROM preserved      Recent Results (from the past 672 hour(s))   Anti-DNA Ab, Double-Stranded    Collection Time: 03/28/23 12:12 PM   Result Value Ref Range    ds DNA Ab Negative 1:10 Negative 1:10   ANTI -SSA ANTIBODY    Collection Time: 03/28/23 12:12 PM   Result Value Ref Range    Anti-SSA Antibody 0.23 0.00 - 0.99 Ratio    Anti-SSA Interpretation Negative Negative   ANTI-SSB ANTIBODY    Collection Time: 03/28/23 12:12 PM   Result Value Ref Range    Anti-SSB Antibody 0.07 0.00 - 0.99 Ratio    Anti-SSB Interpretation Negative Negative   Anti Sm/RNP Antibody    Collection Time: 03/28/23 12:12 PM   Result Value Ref Range    Anti Sm/RNP Antibody 0.12 0.00 - 0.99 Ratio    Anti-Sm/RNP Interpretation Negative Negative   ANTI- PARKER-1 ANTIBODY    Collection Time: 03/28/23 12:12 PM   Result Value Ref Range    Parker-1 Autoantibodies <0.2 <1.0 (Negative) U   Anti-Centromere Antibody    Collection Time: 03/28/23 12:12 PM   Result Value Ref Range    Anti-Centromere Antibody <0.4 <7.0 U/mL   ANTI-SCLERODERMA ANTIBODY    Collection Time: 03/28/23 12:12 PM   Result Value Ref Range    Scleroderma SCL- <0.6 <7.0 U/mL         No results found for: TBGOLDPLUS   No results found for: HAV, HEPAIGM, HEPBIGM, HEPBCAB, HBEAG, HEPCAB       Assessment:     1. Myofascial pain    2. Positive NILE (antinuclear antibody)    3. Chronic bilateral low back pain without sciatica            Plan:     Megan was seen today for positive nile.    Diagnoses and all orders for this visit:    Myofascial pain  -     Ambulatory referral/consult to  Physical/Occupational Therapy; Future  -     methocarbamoL (ROBAXIN) 500 MG Tab; Take 1 tablet (500 mg total) by mouth 3 (three) times daily as needed (muscle spasms).  -     Discontinue: meloxicam (MOBIC) 15 MG tablet; Take 1 tablet (15 mg total) by mouth once daily.    Positive ROBERTO (antinuclear antibody)  Comments:  reflex autoimmune markers were negative; based on pt's symptoms will refer to rheumatology for further evaluation  Orders:  -     Ambulatory referral/consult to Rheumatology    Chronic bilateral low back pain without sciatica  -     HLA B27 Antigen; Future  -     Sedimentation rate; Future  -     C-REACTIVE PROTEIN; Future  -     X-Ray Sacroiliac Joints 3 Views; Future  -     Ambulatory referral/consult to Physical/Occupational Therapy; Future  -     methocarbamoL (ROBAXIN) 500 MG Tab; Take 1 tablet (500 mg total) by mouth 3 (three) times daily as needed (muscle spasms).  -     Discontinue: meloxicam (MOBIC) 15 MG tablet; Take 1 tablet (15 mg total) by mouth once daily.        +ROBERTO   We will recheck sed rate and CRP today  If rash returns, would like to get derm input.  Pt will keep me posted  Polymyalgias  ESR/CRP wnl  Robaxin as above  Discussed potential adverse effects  Literature provided to the patient  Chronic low back pain  Mobic 15 mg daily   Discussed potential adverse effects  Literature provided to the patient  HLA B27 to be done today  X-ray SI joints today  Hypophosphatasia   Per Dr. Sr  Drug therapy requiring intensive monitoring for toxicity  High Risk Medication Monitoring encounter  No current medication related issues, no evidence of toxicity  I ordered labs for toxicity monitoring, have personally reviewed the findings, and discussed them with the patient.  Pending labs will be sent via the portal  Return to clinic: 6-8 wks    No follow-ups on file.    The patient understands, chooses and consents to this plan and accepts all   the risks which include but are not limited to the  risks mentioned above.     Disclaimer: This note was prepared using a voice recognition system and is likely to have sound alike errors within the text.

## 2023-04-24 ENCOUNTER — LAB VISIT (OUTPATIENT)
Dept: LAB | Facility: HOSPITAL | Age: 28
End: 2023-04-24
Attending: PHYSICIAN ASSISTANT
Payer: COMMERCIAL

## 2023-04-24 DIAGNOSIS — M54.50 CHRONIC BILATERAL LOW BACK PAIN WITHOUT SCIATICA: ICD-10-CM

## 2023-04-24 DIAGNOSIS — G89.29 CHRONIC BILATERAL LOW BACK PAIN WITHOUT SCIATICA: ICD-10-CM

## 2023-04-24 DIAGNOSIS — R74.8 LOW SERUM ALKALINE PHOSPHATASE: ICD-10-CM

## 2023-04-24 LAB
CRP SERPL-MCNC: 2.3 MG/L (ref 0–8.2)
ERYTHROCYTE [SEDIMENTATION RATE] IN BLOOD BY PHOTOMETRIC METHOD: <2 MM/HR (ref 0–36)
MAGNESIUM SERPL-MCNC: 1.9 MG/DL (ref 1.6–2.6)

## 2023-04-24 PROCEDURE — 86140 C-REACTIVE PROTEIN: CPT | Performed by: PHYSICIAN ASSISTANT

## 2023-04-24 PROCEDURE — 83735 ASSAY OF MAGNESIUM: CPT | Performed by: INTERNAL MEDICINE

## 2023-04-24 PROCEDURE — 82306 VITAMIN D 25 HYDROXY: CPT | Performed by: INTERNAL MEDICINE

## 2023-04-24 PROCEDURE — 85652 RBC SED RATE AUTOMATED: CPT | Performed by: PHYSICIAN ASSISTANT

## 2023-04-24 PROCEDURE — 81374 HLA I TYPING 1 ANTIGEN LR: CPT | Performed by: PHYSICIAN ASSISTANT

## 2023-04-24 PROCEDURE — 36415 COLL VENOUS BLD VENIPUNCTURE: CPT | Performed by: INTERNAL MEDICINE

## 2023-04-24 PROCEDURE — 83970 ASSAY OF PARATHORMONE: CPT | Performed by: INTERNAL MEDICINE

## 2023-04-25 LAB
25(OH)D3+25(OH)D2 SERPL-MCNC: 45 NG/ML (ref 30–96)
PTH-INTACT SERPL-MCNC: 87.8 PG/ML (ref 9–77)

## 2023-04-26 ENCOUNTER — PATIENT MESSAGE (OUTPATIENT)
Dept: INTERNAL MEDICINE | Facility: CLINIC | Age: 28
End: 2023-04-26
Payer: COMMERCIAL

## 2023-04-26 ENCOUNTER — HOSPITAL ENCOUNTER (OUTPATIENT)
Dept: RADIOLOGY | Facility: HOSPITAL | Age: 28
Discharge: HOME | End: 2023-04-26
Attending: PHYSICIAN ASSISTANT
Payer: COMMERCIAL

## 2023-04-26 DIAGNOSIS — M54.50 CHRONIC BILATERAL LOW BACK PAIN WITHOUT SCIATICA: ICD-10-CM

## 2023-04-26 DIAGNOSIS — G89.29 CHRONIC BILATERAL LOW BACK PAIN WITHOUT SCIATICA: ICD-10-CM

## 2023-04-26 PROCEDURE — 72200 X-RAY EXAM SI JOINTS: CPT | Mod: TC

## 2023-04-26 PROCEDURE — 72200 X-RAY EXAM SI JOINTS: CPT | Mod: 26,,, | Performed by: RADIOLOGY

## 2023-04-26 PROCEDURE — 72200 XR SACROILIAC JOINTS 3 VIEWS: ICD-10-PCS | Mod: 26,,, | Performed by: RADIOLOGY

## 2023-04-27 ENCOUNTER — PATIENT MESSAGE (OUTPATIENT)
Dept: ENDOCRINOLOGY | Facility: CLINIC | Age: 28
End: 2023-04-27

## 2023-05-03 ENCOUNTER — HOSPITAL ENCOUNTER (OUTPATIENT)
Dept: RADIOLOGY | Facility: HOSPITAL | Age: 28
Discharge: HOME | End: 2023-05-03
Attending: INTERNAL MEDICINE
Payer: COMMERCIAL

## 2023-05-03 DIAGNOSIS — E83.39 HYPOPHOSPHATASIA: ICD-10-CM

## 2023-05-03 PROCEDURE — 76770 US EXAM ABDO BACK WALL COMP: CPT | Mod: 26,,, | Performed by: RADIOLOGY

## 2023-05-03 PROCEDURE — 76770 US RETROPERITONEAL COMPLETE: ICD-10-PCS | Mod: 26,,, | Performed by: RADIOLOGY

## 2023-05-03 PROCEDURE — 76770 US EXAM ABDO BACK WALL COMP: CPT | Mod: TC

## 2023-05-09 LAB
HLA B27 INTERPRETATION: NORMAL
HLA-B27 RELATED AG QL: NEGATIVE
HLA-B27 RELATED AG QL: NORMAL

## 2023-05-19 ENCOUNTER — OFFICE VISIT (OUTPATIENT)
Dept: URGENT CARE | Facility: CLINIC | Age: 28
End: 2023-05-19
Payer: COMMERCIAL

## 2023-05-19 ENCOUNTER — HOSPITAL ENCOUNTER (OUTPATIENT)
Dept: RADIOLOGY | Facility: CLINIC | Age: 28
Discharge: HOME | End: 2023-05-19
Attending: PHYSICIAN ASSISTANT
Payer: COMMERCIAL

## 2023-05-19 VITALS
BODY MASS INDEX: 21.66 KG/M2 | HEIGHT: 65 IN | SYSTOLIC BLOOD PRESSURE: 114 MMHG | RESPIRATION RATE: 15 BRPM | WEIGHT: 130 LBS | HEART RATE: 85 BPM | DIASTOLIC BLOOD PRESSURE: 66 MMHG | TEMPERATURE: 99 F | OXYGEN SATURATION: 100 %

## 2023-05-19 DIAGNOSIS — K59.09 OTHER CONSTIPATION: ICD-10-CM

## 2023-05-19 DIAGNOSIS — K59.09 OTHER CONSTIPATION: Primary | ICD-10-CM

## 2023-05-19 PROBLEM — F41.9 ANXIETY: Status: ACTIVE | Noted: 2023-05-19

## 2023-05-19 PROBLEM — Z79.4 ENCOUNTER FOR LONG-TERM (CURRENT) USE OF INSULIN: Status: ACTIVE | Noted: 2023-05-19

## 2023-05-19 PROBLEM — J30.9 ALLERGIC RHINITIS: Status: ACTIVE | Noted: 2023-05-19

## 2023-05-19 PROCEDURE — 74019 XR ABDOMEN FLAT AND ERECT: ICD-10-PCS | Mod: S$GLB,,, | Performed by: STUDENT IN AN ORGANIZED HEALTH CARE EDUCATION/TRAINING PROGRAM

## 2023-05-19 PROCEDURE — 99213 PR OFFICE/OUTPT VISIT, EST, LEVL III, 20-29 MIN: ICD-10-PCS | Mod: S$GLB,,, | Performed by: PHYSICIAN ASSISTANT

## 2023-05-19 PROCEDURE — 74019 RADEX ABDOMEN 2 VIEWS: CPT | Mod: S$GLB,,, | Performed by: STUDENT IN AN ORGANIZED HEALTH CARE EDUCATION/TRAINING PROGRAM

## 2023-05-19 PROCEDURE — 99213 OFFICE O/P EST LOW 20 MIN: CPT | Mod: S$GLB,,, | Performed by: PHYSICIAN ASSISTANT

## 2023-05-19 RX ORDER — SYRING-NEEDL,DISP,INSUL,0.3 ML 29 G X1/2"
296 SYRINGE, EMPTY DISPOSABLE MISCELLANEOUS ONCE
Qty: 296 ML | Refills: 0 | Status: SHIPPED | OUTPATIENT
Start: 2023-05-19 | End: 2023-05-19

## 2023-05-19 NOTE — PATIENT INSTRUCTIONS
Abdominal Pain   If your condition worsens or fails to improve we recommend that you receive another evaluation at the ER immediately or contact your PCP to discuss your concerns or return here. You must understand that you've received an urgent care treatment only and that you may be released before all your medical problems are known or treated. You the patient will arrange for followup care as instructed.   Watch for any increase pain, fever, localized pain to right lower abdomen or continued vomiting or diarrhea.   If you have diarrhea you can use immodium.

## 2023-05-19 NOTE — PROGRESS NOTES
"Subjective:      Patient ID: Megan Estrada is a 28 y.o. female.    Vitals:  height is 5' 5" (1.651 m) and weight is 59 kg (130 lb). Her tympanic temperature is 98.5 °F (36.9 °C). Her blood pressure is 114/66 and her pulse is 85. Her respiration is 15 and oxygen saturation is 100%.     Chief Complaint: Abdominal Pain    Patient is a 28 year old female who presents complaining of constipation and abdomen pain for the last week.  Patient reports this is a common occurrence for and has been occurring more frequently recently.  Patient denies any fevers, chills, body aches, diarrhea, nausea, vomiting, anorexia.  There has been no hematochezia, melena, hematemesis.  Patient states she has abdominal discomfort and some pain since eating today.  Patient denies any localized pain.  Patient states she feels very bloated.  No urinary symptoms noted.  Patient is otherwise well. She has taken linzess without relief and used a suppository with minimal movement.     Abdominal Pain  This is a new problem. The current episode started in the past 7 days. The onset quality is gradual. The problem occurs intermittently. The problem has been gradually worsening. The pain is located in the LLQ. The pain is at a severity of 6/10. The pain is moderate. The quality of the pain is sharp. The abdominal pain does not radiate. Associated symptoms include constipation and flatus. Pertinent negatives include no anorexia, arthralgias, belching, diarrhea, dysuria, fever, frequency, headaches, hematochezia, melena, myalgias, nausea, vomiting or weight loss. There is no history of abdominal surgery.     Constitution: Negative for activity change, appetite change, chills, fatigue and fever.   Cardiovascular:  Negative for chest pain.   Respiratory:  Negative for cough, shortness of breath and wheezing.    Gastrointestinal:  Positive for abdominal pain, abdominal bloating, constipation and hemorrhoids. Negative for abdominal trauma, history of abdominal " surgery, nausea, vomiting, diarrhea, bright red blood in stool, dark colored stools, rectal bleeding and heartburn.   Genitourinary:  Negative for dysuria, frequency and pelvic pain.   Musculoskeletal:  Negative for joint pain, back pain and muscle ache.   Skin:  Negative for rash.   Neurological:  Negative for headaches.    Objective:     Physical Exam   Constitutional: She is oriented to person, place, and time. She appears well-developed.   HENT:   Head: Normocephalic and atraumatic.   Ears:   Right Ear: External ear normal.   Left Ear: External ear normal.   Nose: Nose normal.   Mouth/Throat: Mucous membranes are normal.   Eyes: Conjunctivae and lids are normal.   Neck: Trachea normal. Neck supple.   Cardiovascular: Normal rate, regular rhythm and normal heart sounds.   No murmur heard.  Pulmonary/Chest: Effort normal and breath sounds normal. No respiratory distress. She has no wheezes.   Abdominal: Normal appearance and bowel sounds are normal. She exhibits no distension and no mass. Soft. flat abdomen There is generalized abdominal tenderness. There is no rebound, no guarding, no left CVA tenderness and no right CVA tenderness.   Musculoskeletal: Normal range of motion.         General: Normal range of motion.   Neurological: She is alert and oriented to person, place, and time. She has normal strength.   Skin: Skin is warm, dry, intact, not diaphoretic and not pale.   Psychiatric: Her speech is normal and behavior is normal. Judgment and thought content normal.   Nursing note and vitals reviewed.    XR ABDOMEN FLAT AND ERECT    Result Date: 5/19/2023  EXAM: XR ABDOMEN FLAT AND ERECT CLINICAL HISTORY: Constipation. FINDINGS: There is no radiographic evidence of small bowel obstruction, ileus, or gross free air.  No radiopaque urinary tract calculi are identified.      No definite evidence of acute abdominal disease. Finalized on: 5/19/2023 6:29 PM By:  Armaan Funez MD [facility]# [id number]      2023-05-19  18:31:[id number]    [facility]    Assessment:     1. Other constipation      Patient in no acute distress with normal vital signs. Recent labs without acute findings. Flat and Erect x-rays today reveal constipation without free air or obstructive bowel gas pattern. Patient with  benign abdominal exam today with minimal generalized tenderness. No guarding or rebound tenderness. No BM with OTC medications tried.     Plan:       Other constipation  -     XR ABDOMEN FLAT AND ERECT; Future; Expected date: 05/19/2023    Discussed xrays with patient and will call if radiology has other notes to add to my personal read at this time.   Discussed use of magnesium citrate and increased fluid intake. Discussed increase fiber daily and drink plenty of water to prevent more constipation.   Follow up with PCP in 3-5 days for further care of chronic constipation.     Saray Murillo PA-C    Patient Instructions                                                           Abdominal Pain   If your condition worsens or fails to improve we recommend that you receive another evaluation at the ER immediately or contact your PCP to discuss your concerns or return here. You must understand that you've received an urgent care treatment only and that you may be released before all your medical problems are known or treated. You the patient will arrange for followup care as instructed.   Watch for any increase pain, fever, localized pain to right lower abdomen or continued vomiting or diarrhea.   If you have diarrhea you can use immodium.

## 2023-05-31 ENCOUNTER — TELEPHONE (OUTPATIENT)
Dept: ENDOCRINOLOGY | Facility: CLINIC | Age: 28
End: 2023-05-31

## 2023-05-31 NOTE — TELEPHONE ENCOUNTER
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 ALPL gene mutation was negative, but this does not rule out HPP. 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

## 2023-06-05 ENCOUNTER — PATIENT MESSAGE (OUTPATIENT)
Dept: ENDOCRINOLOGY | Facility: CLINIC | Age: 28
End: 2023-06-05

## 2023-06-06 RX ORDER — ONDANSETRON 4 MG/1
4 TABLET, ORALLY DISINTEGRATING ORAL
COMMUNITY
Start: 2023-06-01 | End: 2023-08-11

## 2023-06-08 ENCOUNTER — OFFICE VISIT (OUTPATIENT)
Dept: RHEUMATOLOGY | Facility: CLINIC | Age: 28
End: 2023-06-08
Payer: COMMERCIAL

## 2023-06-08 VITALS
DIASTOLIC BLOOD PRESSURE: 72 MMHG | BODY MASS INDEX: 22 KG/M2 | HEIGHT: 65 IN | WEIGHT: 132.06 LBS | HEART RATE: 81 BPM | SYSTOLIC BLOOD PRESSURE: 111 MMHG

## 2023-06-08 DIAGNOSIS — M79.18 MYOFASCIAL PAIN: Primary | ICD-10-CM

## 2023-06-08 DIAGNOSIS — G89.29 CHRONIC BILATERAL LOW BACK PAIN WITHOUT SCIATICA: ICD-10-CM

## 2023-06-08 DIAGNOSIS — M54.50 CHRONIC BILATERAL LOW BACK PAIN WITHOUT SCIATICA: ICD-10-CM

## 2023-06-08 PROCEDURE — 99214 PR OFFICE/OUTPT VISIT, EST, LEVL IV, 30-39 MIN: ICD-10-PCS | Mod: S$GLB,,, | Performed by: PHYSICIAN ASSISTANT

## 2023-06-08 PROCEDURE — 1160F PR REVIEW ALL MEDS BY PRESCRIBER/CLIN PHARMACIST DOCUMENTED: ICD-10-PCS | Mod: CPTII,S$GLB,, | Performed by: PHYSICIAN ASSISTANT

## 2023-06-08 PROCEDURE — 3008F BODY MASS INDEX DOCD: CPT | Mod: CPTII,S$GLB,, | Performed by: PHYSICIAN ASSISTANT

## 2023-06-08 PROCEDURE — 3074F PR MOST RECENT SYSTOLIC BLOOD PRESSURE < 130 MM HG: ICD-10-PCS | Mod: CPTII,S$GLB,, | Performed by: PHYSICIAN ASSISTANT

## 2023-06-08 PROCEDURE — 3008F PR BODY MASS INDEX (BMI) DOCUMENTED: ICD-10-PCS | Mod: CPTII,S$GLB,, | Performed by: PHYSICIAN ASSISTANT

## 2023-06-08 PROCEDURE — 1159F PR MEDICATION LIST DOCUMENTED IN MEDICAL RECORD: ICD-10-PCS | Mod: CPTII,S$GLB,, | Performed by: PHYSICIAN ASSISTANT

## 2023-06-08 PROCEDURE — 3078F DIAST BP <80 MM HG: CPT | Mod: CPTII,S$GLB,, | Performed by: PHYSICIAN ASSISTANT

## 2023-06-08 PROCEDURE — 1159F MED LIST DOCD IN RCRD: CPT | Mod: CPTII,S$GLB,, | Performed by: PHYSICIAN ASSISTANT

## 2023-06-08 PROCEDURE — 1160F RVW MEDS BY RX/DR IN RCRD: CPT | Mod: CPTII,S$GLB,, | Performed by: PHYSICIAN ASSISTANT

## 2023-06-08 PROCEDURE — 3078F PR MOST RECENT DIASTOLIC BLOOD PRESSURE < 80 MM HG: ICD-10-PCS | Mod: CPTII,S$GLB,, | Performed by: PHYSICIAN ASSISTANT

## 2023-06-08 PROCEDURE — 3074F SYST BP LT 130 MM HG: CPT | Mod: CPTII,S$GLB,, | Performed by: PHYSICIAN ASSISTANT

## 2023-06-08 PROCEDURE — 99999 PR PBB SHADOW E&M-EST. PATIENT-LVL V: CPT | Mod: PBBFAC,,, | Performed by: PHYSICIAN ASSISTANT

## 2023-06-08 PROCEDURE — 99214 OFFICE O/P EST MOD 30 MIN: CPT | Mod: S$GLB,,, | Performed by: PHYSICIAN ASSISTANT

## 2023-06-08 PROCEDURE — 99999 PR PBB SHADOW E&M-EST. PATIENT-LVL V: ICD-10-PCS | Mod: PBBFAC,,, | Performed by: PHYSICIAN ASSISTANT

## 2023-06-08 RX ORDER — CELECOXIB 200 MG/1
200 CAPSULE ORAL DAILY
Qty: 30 CAPSULE | Refills: 3 | Status: SHIPPED | OUTPATIENT
Start: 2023-06-08 | End: 2023-10-30

## 2023-06-08 RX ORDER — METHOCARBAMOL 500 MG/1
500 TABLET, FILM COATED ORAL 3 TIMES DAILY PRN
Qty: 60 TABLET | Refills: 1 | Status: SHIPPED | OUTPATIENT
Start: 2023-06-08 | End: 2023-08-11

## 2023-06-08 NOTE — PROGRESS NOTES
Subjective:      Patient ID: Megan Estrada is a 28 y.o. female.    Chief Complaint: Disease Management and Positive ROBERTO      HPI   Megan Estrada  is a 28 y.o. female here for follow-up on +ROBERTO.  She complains of pain in the upper thoracic region.  Also w some back/neck chronically but worsening over the last 3 years.  She also noticed a rash on her chest a few mos ago.  She did 40 mg of prednisone for 5 days in addition to Zantac and Claritin.  Within about 2-3 days it had improved.  Has not recurred.  She was noted to have a positive ROBERTO by primary care.  The titer 160 with a homogeneous pattern.    I put her on Mobic and Robaxin for myofascial pain and chronic low back pain.  She says this has been beneficial to her.  Unfortunately she has had worsening constipation.    She has had blood in her urine in the past.  She sees Urology.  They were not concerned.  She has a 1st cousin on her mom's side with rheumatoid arthritis.  Her grandmother had MS.      Patient denies fevers, chills, photosensitivity, eye pain, shortness of breath, chest pain, hematuria, blood in the stool, sicca symptoms, raynauds, finger ulcerations.  Rheumatologic systems otherwise negative.    Serologies/Labs:  Neg anti Scl 70, anti PARKER-1, anti sm/rnp, ssa, ssb, ds dna  ESR/CRP wnl  Neg CCP, RF,  Neg HLA B27  + ROBERTO 1:160 homogenous  Current Treatment:  Na  Previous Treatment:   na      Current Outpatient Medications:     erenumab-aooe (AIMOVIG AUTOINJECTOR) 70 mg/mL autoinjector, Inject 70 mg into the skin., Disp: , Rfl:     fluocinonide (LIDEX) 0.05 % external solution, AAA scalp qday - bid prn pruritus, Disp: 60 mL, Rfl: 3    fluticasone propionate (FLONASE) 50 mcg/actuation nasal spray, 1 spray (50 mcg total) by Each Nostril route once daily., Disp: 16 g, Rfl: 2    ketoconazole (NIZORAL) 2 % shampoo, Apply topically once a week. Lather in for 5-10 min before rinsing, Disp: 120 mL, Rfl: 5    linaCLOtide (LINZESS) 72 mcg Cap capsule, Take 1  "capsule (72 mcg total) by mouth before breakfast., Disp: 90 capsule, Rfl: 1    ondansetron (ZOFRAN-ODT) 4 MG TbDL, Take 4 mg by mouth., Disp: , Rfl:     OXcarbazepine (TRILEPTAL) 300 MG Tab, Take 300 mg by mouth once daily., Disp: , Rfl:     paroxetine (PAXIL) 40 MG tablet, TK 1 T PO QD IN THE MORNING, Disp: , Rfl:     spironolactone (ALDACTONE) 50 MG tablet, Take 1 tablet (50 mg total) by mouth once daily., Disp: 90 tablet, Rfl: 3    traZODone (DESYREL) 100 MG tablet, Take 100 mg by mouth every evening., Disp: , Rfl:     tretinoin (RETIN-A) 0.05 % cream, Apply topically every evening., Disp: 45 g, Rfl: 11    TRI-LO-SPRINTEC 0.18/0.215/0.25 mg-25 mcg tablet, TK 1 T PO QD, Disp: , Rfl:     ubrogepant (UBRELVY) 100 mg tablet, Take by mouth., Disp: , Rfl:     celecoxib (CELEBREX) 200 MG capsule, Take 1 capsule (200 mg total) by mouth once daily., Disp: 30 capsule, Rfl: 3    methocarbamoL (ROBAXIN) 500 MG Tab, Take 1 tablet (500 mg total) by mouth 3 (three) times daily as needed (muscle spasms)., Disp: 60 tablet, Rfl: 1    Past Medical History:   Diagnosis Date    Abnormal Pap smear of cervix     Bipolar disorder     Generalized anxiety disorder     Obsessive-compulsive disorder      Family History   Problem Relation Age of Onset    Bipolar disorder Father      Social History     Socioeconomic History    Marital status: Single   Occupational History     Comment: [employer]   Tobacco Use    Smoking status: Never    Smokeless tobacco: Never   Substance and Sexual Activity    Alcohol use: Not Currently    Drug use: Yes     Types: Marijuana     Comment: occasional     Review of patient's allergies indicates:   Allergen Reactions    Adhesive     Latex        Objective:   /72   Pulse 81   Ht 5' 5" (1.651 m)   Wt 59.9 kg (132 lb 0.9 oz)   LMP 05/17/2023   BMI 21.98 kg/m²   Immunization History   Administered Date(s) Administered    COVID-19, MRNA, LN-S, PF (Pfizer) (Purple Cap) 04/01/2021, 04/21/2021, " 12/08/2021    Influenza 10/19/2022    Influenza - Quadrivalent - MDCK - PF 12/08/2021    Influenza - Trivalent (ADULT) 10/13/2010, 10/05/2011    Td (ADULT) 10/24/2021    Zoster Recombinant 07/20/2018       Physical Exam   Constitutional: She is oriented to person, place, and time. No distress.   HENT:   Head: Normocephalic and atraumatic.   Pulmonary/Chest: Effort normal.   Abdominal: She exhibits no distension.   Musculoskeletal:         General: No swelling or tenderness. Normal range of motion.      Cervical back: Normal range of motion.   Lymphadenopathy:     She has no cervical adenopathy.   Neurological: She is alert and oriented to person, place, and time.   Skin: Skin is warm and dry. No rash noted.   Psychiatric: Mood normal.   Nursing note and vitals reviewed.    No active synovitis, enthesitis, dactylitis  Multiple tender trigger points  Sign ttp over the SI joints and in the myofascial region  NVI BUE and BLE  ROM preserved      No results found for this or any previous visit (from the past 672 hour(s)).  Recent Results (from the past 2016 hour(s))   ROBERTO by IFA, w/Rflx    Collection Time: 03/22/23  5:31 PM   Result Value Ref Range    ROBERTO Positive (A) Negative <1:80   RHEUMATOID FACTOR    Collection Time: 03/22/23  5:31 PM   Result Value Ref Range    Rheumatoid Factor <13.0 0.0 - 15.0 IU/mL   CYCLIC CITRUL PEPTIDE ANTIBODY, IGG    Collection Time: 03/22/23  5:31 PM   Result Value Ref Range    CCP Antibodies 0.9 <5.0 U/mL   Sedimentation rate    Collection Time: 03/22/23  5:31 PM   Result Value Ref Range    Sed Rate 3 0 - 36 mm/Hr   C-REACTIVE PROTEIN    Collection Time: 03/22/23  5:31 PM   Result Value Ref Range    CRP 2.3 0.0 - 8.2 mg/L   Comprehensive Metabolic Panel    Collection Time: 03/22/23  5:31 PM   Result Value Ref Range    Sodium 138 136 - 145 mmol/L    Potassium 3.7 3.5 - 5.1 mmol/L    Chloride 104 95 - 110 mmol/L    CO2 26 23 - 29 mmol/L    Glucose 80 70 - 110 mg/dL    BUN 10 6 - 20 mg/dL     Creatinine 0.7 0.5 - 1.4 mg/dL    Calcium 9.2 8.7 - 10.5 mg/dL    Total Protein 6.9 6.0 - 8.4 g/dL    Albumin 4.0 3.5 - 5.2 g/dL    Total Bilirubin 0.2 0.1 - 1.0 mg/dL    Alkaline Phosphatase 42 (L) 55 - 135 U/L    AST 18 10 - 40 U/L    ALT 22 10 - 44 U/L    Anion Gap 8 8 - 16 mmol/L    eGFR >60.0 >60 mL/min/1.73 m^2   TSH    Collection Time: 03/22/23  5:31 PM   Result Value Ref Range    TSH 0.715 0.400 - 4.000 uIU/mL   T4, Free    Collection Time: 03/22/23  5:31 PM   Result Value Ref Range    Free T4 0.78 0.71 - 1.51 ng/dL   CBC Auto Differential    Collection Time: 03/22/23  5:31 PM   Result Value Ref Range    WBC 7.04 3.90 - 12.70 K/uL    RBC 4.01 4.00 - 5.40 M/uL    Hemoglobin 12.1 12.0 - 16.0 g/dL    Hematocrit 37.4 37.0 - 48.5 %    MCV 93 82 - 98 fL    MCH 30.2 27.0 - 31.0 pg    MCHC 32.4 32.0 - 36.0 g/dL    RDW 12.4 11.5 - 14.5 %    Platelets 226 150 - 450 K/uL    MPV 11.3 9.2 - 12.9 fL    Immature Granulocytes 0.1 0.0 - 0.5 %    Gran # (ANC) 3.7 1.8 - 7.7 K/uL    Immature Grans (Abs) 0.01 0.00 - 0.04 K/uL    Lymph # 2.8 1.0 - 4.8 K/uL    Mono # 0.5 0.3 - 1.0 K/uL    Eos # 0.1 0.0 - 0.5 K/uL    Baso # 0.03 0.00 - 0.20 K/uL    nRBC 0 0 /100 WBC    Gran % 52.1 38.0 - 73.0 %    Lymph % 39.6 18.0 - 48.0 %    Mono % 6.4 4.0 - 15.0 %    Eosinophil % 1.4 0.0 - 8.0 %    Basophil % 0.4 0.0 - 1.9 %    Differential Method Automated    ROBERTO Pattern 1    Collection Time: 03/22/23  5:31 PM   Result Value Ref Range    ROBERTO PATTERN 1 Homogeneous    ROBERTO Titer 1    Collection Time: 03/22/23  5:31 PM   Result Value Ref Range    ROBERTO Titer 1 1:160    Anti-DNA Ab, Double-Stranded    Collection Time: 03/28/23 12:12 PM   Result Value Ref Range    ds DNA Ab Negative 1:10 Negative 1:10   ANTI -SSA ANTIBODY    Collection Time: 03/28/23 12:12 PM   Result Value Ref Range    Anti-SSA Antibody 0.23 0.00 - 0.99 Ratio    Anti-SSA Interpretation Negative Negative   ANTI-SSB ANTIBODY    Collection Time: 03/28/23 12:12 PM   Result Value Ref Range     Anti-SSB Antibody 0.07 0.00 - 0.99 Ratio    Anti-SSB Interpretation Negative Negative   Anti Sm/RNP Antibody    Collection Time: 03/28/23 12:12 PM   Result Value Ref Range    Anti Sm/RNP Antibody 0.12 0.00 - 0.99 Ratio    Anti-Sm/RNP Interpretation Negative Negative   ANTI- PARKER-1 ANTIBODY    Collection Time: 03/28/23 12:12 PM   Result Value Ref Range    Parker-1 Autoantibodies <0.2 <1.0 (Negative) U   Anti-Centromere Antibody    Collection Time: 03/28/23 12:12 PM   Result Value Ref Range    Anti-Centromere Antibody <0.4 <7.0 U/mL   ANTI-SCLERODERMA ANTIBODY    Collection Time: 03/28/23 12:12 PM   Result Value Ref Range    Scleroderma SCL- <0.6 <7.0 U/mL   PTH, Intact    Collection Time: 04/24/23  2:18 PM   Result Value Ref Range    PTH, Intact 87.8 (H) 9.0 - 77.0 pg/mL   Magnesium    Collection Time: 04/24/23  2:18 PM   Result Value Ref Range    Magnesium 1.9 1.6 - 2.6 mg/dL   Vitamin D    Collection Time: 04/24/23  2:18 PM   Result Value Ref Range    Vit D, 25-Hydroxy 45 30 - 96 ng/mL   HLA B27 Antigen    Collection Time: 04/24/23  2:18 PM   Result Value Ref Range    HLA B27 Interpretation SAPE: 212  TAQ: [id number]       B27 Testing Date 05/05/2023 10:55 AM     HLA B27 Result Negative    Sedimentation rate    Collection Time: 04/24/23  2:18 PM   Result Value Ref Range    Sed Rate <2 0 - 36 mm/Hr   C-REACTIVE PROTEIN    Collection Time: 04/24/23  2:18 PM   Result Value Ref Range    CRP 2.3 0.0 - 8.2 mg/L          No results found for: TBGOLDPLUS   No results found for: HAV, HEPAIGM, HEPBIGM, HEPBCAB, HBEAG, HEPCAB       Assessment:     1. Myofascial pain    2. Chronic bilateral low back pain without sciatica            Plan:     Megan was seen today for disease management and positive nile.    Diagnoses and all orders for this visit:    Myofascial pain  -     Ambulatory referral/consult to Physical/Occupational Therapy; Future  -     celecoxib (CELEBREX) 200 MG capsule; Take 1 capsule (200 mg total) by mouth once  daily.  -     methocarbamoL (ROBAXIN) 500 MG Tab; Take 1 tablet (500 mg total) by mouth 3 (three) times daily as needed (muscle spasms).    Chronic bilateral low back pain without sciatica  -     Ambulatory referral/consult to Physical/Occupational Therapy; Future  -     celecoxib (CELEBREX) 200 MG capsule; Take 1 capsule (200 mg total) by mouth once daily.  -     methocarbamoL (ROBAXIN) 500 MG Tab; Take 1 tablet (500 mg total) by mouth 3 (three) times daily as needed (muscle spasms).        +ROBERTO   No sign CTD today  No evidence end organ damage  Will monitor w next visit  If rash returns, would like to get derm input.  Pt will keep me posted  Polymyalgias  ESR/CRP wnl  C/w Robaxin prn  Chronic low back pain w myofascial pain  DC Mobic d/t constipation  Celebrex 200 mg daily - lower incidence of constipation according to drug data  Discussed potential adverse effects  Literature provided to the patient  PT - pt requests Lewy on [place]  X-ray SI joints essentially normal  MRI if sxs worsen  Hypophosphatasia   Per Dr. Sr  Drug therapy requiring intensive monitoring for toxicity  High Risk Medication Monitoring encounter  No current medication related issues, no evidence of toxicity  I ordered labs for toxicity monitoring, have personally reviewed the findings, and discussed them with the patient.  Pending labs will be sent via the portal  Return to clinic: 6-8 wks    Follow up in about 4 months (around 10/8/2023).    The patient understands, chooses and consents to this plan and accepts all the risks which include but are not limited to the risks mentioned above.     Disclaimer: This note was prepared using a voice recognition system and is likely to have sound alike errors within the text.

## 2023-06-20 ENCOUNTER — PATIENT MESSAGE (OUTPATIENT)
Dept: ENDOCRINOLOGY | Facility: CLINIC | Age: 28
End: 2023-06-20

## 2023-07-07 ENCOUNTER — PATIENT MESSAGE (OUTPATIENT)
Dept: ENDOCRINOLOGY | Facility: CLINIC | Age: 28
End: 2023-07-07

## 2023-07-27 ENCOUNTER — PATIENT MESSAGE (OUTPATIENT)
Dept: ENDOCRINOLOGY | Facility: CLINIC | Age: 28
End: 2023-07-27

## 2023-08-11 ENCOUNTER — PATIENT MESSAGE (OUTPATIENT)
Dept: ENDOCRINOLOGY | Facility: CLINIC | Age: 28
End: 2023-08-11

## 2023-08-11 ENCOUNTER — HOSPITAL ENCOUNTER (OUTPATIENT)
Dept: RADIOLOGY | Facility: HOSPITAL | Age: 28
Discharge: HOME | End: 2023-08-11
Attending: INTERNAL MEDICINE
Payer: COMMERCIAL

## 2023-08-11 ENCOUNTER — OFFICE VISIT (OUTPATIENT)
Dept: ENDOCRINOLOGY | Facility: CLINIC | Age: 28
End: 2023-08-11
Payer: COMMERCIAL

## 2023-08-11 ENCOUNTER — TELEPHONE (OUTPATIENT)
Dept: ENDOCRINOLOGY | Facility: CLINIC | Age: 28
End: 2023-08-11

## 2023-08-11 VITALS
DIASTOLIC BLOOD PRESSURE: 73 MMHG | HEART RATE: 80 BPM | TEMPERATURE: 99 F | WEIGHT: 137.81 LBS | SYSTOLIC BLOOD PRESSURE: 112 MMHG | BODY MASS INDEX: 22.96 KG/M2 | HEIGHT: 65 IN

## 2023-08-11 DIAGNOSIS — E83.39 HYPOPHOSPHATASIA: Primary | ICD-10-CM

## 2023-08-11 DIAGNOSIS — E83.39 HYPOPHOSPHATASIA: ICD-10-CM

## 2023-08-11 PROBLEM — Z79.4 ENCOUNTER FOR LONG-TERM (CURRENT) USE OF INSULIN: Status: RESOLVED | Noted: 2023-05-19 | Resolved: 2023-08-11

## 2023-08-11 PROCEDURE — 99999 PR PBB SHADOW E&M-EST. PATIENT-LVL V: ICD-10-PCS | Mod: PBBFAC,,, | Performed by: INTERNAL MEDICINE

## 2023-08-11 PROCEDURE — 73600 XR ANKLE 2 VIEW BILATERAL: ICD-10-PCS | Mod: 26,,, | Performed by: RADIOLOGY

## 2023-08-11 PROCEDURE — 72040 X-RAY EXAM NECK SPINE 2-3 VW: CPT | Mod: 26,,, | Performed by: RADIOLOGY

## 2023-08-11 PROCEDURE — 72040 XR CERVICAL SPINE AP LATERAL: ICD-10-PCS | Mod: 26,,, | Performed by: RADIOLOGY

## 2023-08-11 PROCEDURE — 3074F PR MOST RECENT SYSTOLIC BLOOD PRESSURE < 130 MM HG: ICD-10-PCS | Mod: CPTII,S$GLB,, | Performed by: INTERNAL MEDICINE

## 2023-08-11 PROCEDURE — 1159F MED LIST DOCD IN RCRD: CPT | Mod: CPTII,S$GLB,, | Performed by: INTERNAL MEDICINE

## 2023-08-11 PROCEDURE — 3078F PR MOST RECENT DIASTOLIC BLOOD PRESSURE < 80 MM HG: ICD-10-PCS | Mod: CPTII,S$GLB,, | Performed by: INTERNAL MEDICINE

## 2023-08-11 PROCEDURE — 99999 PR PBB SHADOW E&M-EST. PATIENT-LVL V: CPT | Mod: PBBFAC,,, | Performed by: INTERNAL MEDICINE

## 2023-08-11 PROCEDURE — 72040 X-RAY EXAM NECK SPINE 2-3 VW: CPT | Mod: TC

## 2023-08-11 PROCEDURE — 73030 X-RAY EXAM OF SHOULDER: CPT | Mod: 26,,, | Performed by: RADIOLOGY

## 2023-08-11 PROCEDURE — 3008F BODY MASS INDEX DOCD: CPT | Mod: CPTII,S$GLB,, | Performed by: INTERNAL MEDICINE

## 2023-08-11 PROCEDURE — 3078F DIAST BP <80 MM HG: CPT | Mod: CPTII,S$GLB,, | Performed by: INTERNAL MEDICINE

## 2023-08-11 PROCEDURE — 73130 X-RAY EXAM OF HAND: CPT | Mod: 26,,, | Performed by: RADIOLOGY

## 2023-08-11 PROCEDURE — 73560 XR KNEE 1 OR 2 VIEW BILATERAL: ICD-10-PCS | Mod: 26,,, | Performed by: RADIOLOGY

## 2023-08-11 PROCEDURE — 3008F PR BODY MASS INDEX (BMI) DOCUMENTED: ICD-10-PCS | Mod: CPTII,S$GLB,, | Performed by: INTERNAL MEDICINE

## 2023-08-11 PROCEDURE — 99214 OFFICE O/P EST MOD 30 MIN: CPT | Mod: S$GLB,,, | Performed by: INTERNAL MEDICINE

## 2023-08-11 PROCEDURE — 73130 X-RAY EXAM OF HAND: CPT | Mod: TC,50

## 2023-08-11 PROCEDURE — 99214 PR OFFICE/OUTPT VISIT, EST, LEVL IV, 30-39 MIN: ICD-10-PCS | Mod: S$GLB,,, | Performed by: INTERNAL MEDICINE

## 2023-08-11 PROCEDURE — 73030 XR SHOULDER COMPLETE 2 OR MORE VIEWS BILATERAL: ICD-10-PCS | Mod: 26,,, | Performed by: RADIOLOGY

## 2023-08-11 PROCEDURE — 73130 XR HAND COMPLETE 3 VIEWS BILATERAL: ICD-10-PCS | Mod: 26,,, | Performed by: RADIOLOGY

## 2023-08-11 PROCEDURE — 1159F PR MEDICATION LIST DOCUMENTED IN MEDICAL RECORD: ICD-10-PCS | Mod: CPTII,S$GLB,, | Performed by: INTERNAL MEDICINE

## 2023-08-11 PROCEDURE — 3074F SYST BP LT 130 MM HG: CPT | Mod: CPTII,S$GLB,, | Performed by: INTERNAL MEDICINE

## 2023-08-11 PROCEDURE — 73560 X-RAY EXAM OF KNEE 1 OR 2: CPT | Mod: 26,,, | Performed by: RADIOLOGY

## 2023-08-11 PROCEDURE — 73560 X-RAY EXAM OF KNEE 1 OR 2: CPT | Mod: TC,50

## 2023-08-11 PROCEDURE — 73600 X-RAY EXAM OF ANKLE: CPT | Mod: 26,,, | Performed by: RADIOLOGY

## 2023-08-11 PROCEDURE — 73030 X-RAY EXAM OF SHOULDER: CPT | Mod: TC,50

## 2023-08-11 PROCEDURE — 73600 X-RAY EXAM OF ANKLE: CPT | Mod: TC,50

## 2023-08-11 RX ORDER — ASFOTASE ALFA 80 MG/.8ML
SOLUTION SUBCUTANEOUS
COMMUNITY
Start: 2023-08-04 | End: 2023-11-10 | Stop reason: SDUPTHER

## 2023-08-11 RX ORDER — ASFOTASE ALFA 40 MG/ML
SOLUTION SUBCUTANEOUS
COMMUNITY
Start: 2023-08-04 | End: 2023-11-10 | Stop reason: SDUPTHER

## 2023-08-11 RX ORDER — METHOCARBAMOL 750 MG/1
750 TABLET, FILM COATED ORAL 3 TIMES DAILY
COMMUNITY
Start: 2023-06-21 | End: 2023-08-30 | Stop reason: SDUPTHER

## 2023-08-11 RX ORDER — NORGESTIMATE AND ETHINYL ESTRADIOL 7DAYSX3 28
1 KIT ORAL
COMMUNITY
Start: 2023-07-16

## 2023-08-11 NOTE — PROGRESS NOTES
FOLLOW-UP PATIENT VISIT    Subjective:      Chief Complaint:  Hypophosphatasia    HPI: Megan Estrada is a 28 y.o. female who is here for a follow-up evaluation hypophosphatasia      Past Medical History:   Diagnosis Date    Abnormal Pap smear of cervix     Bipolar disorder     Generalized anxiety disorder     Obsessive-compulsive disorder      With regards to hypophosphatasia:    At the initial visit, she was referred for evaluation for adrenal insufficiency. She had a low 8AM cortisol, but it was likely drawn while she was on prednisone. We repeated an 8AM cortisol which came back at 13.5 along with ACTH of 20, essentially ruling out adrenal insufficiency. We investigated the finding of chronically low serum alkaline phosphatase. B6 was on the high end and urine PEA was normal. ALPL genetic mutation testing was negative. Of note, her mom has low alkaline phosphatase on labs from 2020 and dad has normal alkaline phosphatase.    120 mg x 3 doses/week.    She provided a detailed letter on symptomatology related to low alkaline phosphatase.    She started on Strensiq on 8/10/2023        Outside labs:                  Lab Results   Component Value Date    ALKALINEPHOS 5.3 01/25/2023    ALKPHOS 42 (L) 03/22/2023    ALKPHOS 32 (L) 01/10/2023    VITAMINB6 43 01/25/2023         FH: grandfather and grand uncle - lung cancer; cervical/breast cancer  SH: No tobacco, social EtOH; works as a     Takes spironolactone 50 mg daily, but usually only does this when her acne is flaring. She is not taking it currently. She has been on OCPS (currently Tri-lo-Sprintec - norgestimate and ethinyl estradiol) since age 12 for abnormal cycles. Her OBGYN has diagnosed her with PCOS. She denies hirsutism or other signs of virilization. +Acne. Had transvag U/S at some point, but she's not sure the result of that test.      Reviewed past medical, family, social history and updated as appropriate.    Objective:     Vitals:     "08/11/23 0910   BP: 112/73   Pulse: 80   Temp: 98.7 °F (37.1 °C)         BP Readings from Last 5 Encounters:   08/11/23 112/73   06/08/23 111/72   05/19/23 114/66   04/20/23 113/74   03/22/23 102/62         Physical Exam  Vitals and nursing note reviewed.   Constitutional:       General: She is not in acute distress.     Appearance: Normal appearance. She is well-developed. She is not ill-appearing.   HENT:      Head: Normocephalic and atraumatic.   Eyes:      General:         Right eye: No discharge.         Left eye: No discharge.      Conjunctiva/sclera: Conjunctivae normal.   Neck:      Thyroid: No thyroid mass, thyromegaly or thyroid tenderness.      Trachea: No tracheal deviation.   Cardiovascular:      Rate and Rhythm: Regular rhythm.      Comments: Borderline tachycardia  Pulmonary:      Effort: Pulmonary effort is normal. No respiratory distress.      Breath sounds: Normal breath sounds.   Musculoskeletal:      Comments: No digital clubbing or extremity cyanosis   Skin:     Comments: Fair complexion (baseline per patient).  Negative hyperpigmentation of the palmar creases or buccal mucosa   Neurological:      Mental Status: She is alert and oriented to person, place, and time.      Coordination: Coordination normal.   Psychiatric:         Mood and Affect: Mood normal.         Behavior: Behavior normal.           Wt Readings from Last 30 Encounters:   08/11/23 0910 62.5 kg (137 lb 12.6 oz)   06/08/23 1336 59.9 kg (132 lb 0.9 oz)   05/19/23 1745 59 kg (130 lb)   04/20/23 1213 58 kg (127 lb 13.9 oz)   03/22/23 1624 58.2 kg (128 lb 4.9 oz)   01/17/23 1030 55.7 kg (122 lb 12.7 oz)   01/06/23 1406 57.4 kg (126 lb 8.7 oz)   11/22/22 0818 56.2 kg (123 lb 14.4 oz)   12/24/20 1516 54.4 kg (120 lb)       No results found for: "HGBA1C"  No results found for: "CHOL", "HDL", "LDLCALC", "TRIG", "CHOLHDL"  Lab Results   Component Value Date     03/22/2023    K 3.7 03/22/2023     03/22/2023    CO2 26 03/22/2023 " "   GLU 80 03/22/2023    BUN 10 03/22/2023    CREATININE 0.7 03/22/2023    CALCIUM 9.2 03/22/2023    PROT 6.9 03/22/2023    ALBUMIN 4.0 03/22/2023    BILITOT 0.2 03/22/2023    ALKPHOS 42 (L) 03/22/2023    AST 18 03/22/2023    ALT 22 03/22/2023    ANIONGAP 8 03/22/2023    TSH 0.715 03/22/2023      No results found for: "MICALBCREAT"    Assessment/Plan:     Hypophosphatasia  Continue Strensiq therapy at 2 mg/kg (120 mg) x 3 days/week  Check baseline X-rays to look for evidence of microfractures/osteomalacia.  Check baseline DXA.    Given symptom tracker card. Fill one out now for baseline and then fill out again in 3 months prior to our follow-up visit.     FMLA paperwork filled out. May require periodic absences based on symptoms.     HPP labs/imaging:     Serum calcium, phosphorus, PTH, Vitamin D, Vitamin B6 - 3 months after starting treatment, then annually  DXA scan every 5 years; noting that anti-resorptive treatments should be avoided.  Yearly renal ultrasound  Yearly eye exams     RTC 3 months - virtual is fine.  "

## 2023-08-11 NOTE — ASSESSMENT & PLAN NOTE
Continue Strensiq therapy at 2 mg/kg (120 mg) x 3 days/week  Check baseline X-rays to look for evidence of microfractures/osteomalacia.  Check baseline DXA.    Given symptom tracker card. Fill one out now for baseline and then fill out again in 3 months prior to our follow-up visit.     [facility] paperwork filled out. May require periodic absences based on symptoms.     HPP labs/imagin. Serum calcium, phosphorus, PTH, Vitamin D, Vitamin B6 - 3 months after starting treatment, then annually  2. DXA scan every 5 years; noting that anti-resorptive treatments should be avoided.  3. Yearly renal ultrasound  4. Yearly eye exams    
R knee pain causing difficulty ambulating

## 2023-08-14 ENCOUNTER — PATIENT MESSAGE (OUTPATIENT)
Dept: ENDOCRINOLOGY | Facility: CLINIC | Age: 28
End: 2023-08-14

## 2023-08-14 DIAGNOSIS — E83.39 HYPOPHOSPHATASIA: Primary | ICD-10-CM

## 2023-08-17 ENCOUNTER — OFFICE VISIT (OUTPATIENT)
Dept: INTERNAL MEDICINE | Facility: CLINIC | Age: 28
End: 2023-08-17
Payer: COMMERCIAL

## 2023-08-17 DIAGNOSIS — M25.542 ARTHRALGIA OF BOTH HANDS: ICD-10-CM

## 2023-08-17 DIAGNOSIS — K59.04 CHRONIC IDIOPATHIC CONSTIPATION: ICD-10-CM

## 2023-08-17 DIAGNOSIS — E83.39 HYPOPHOSPHATASIA: Primary | ICD-10-CM

## 2023-08-17 DIAGNOSIS — M25.541 ARTHRALGIA OF BOTH HANDS: ICD-10-CM

## 2023-08-17 DIAGNOSIS — R68.82 LOW LIBIDO: ICD-10-CM

## 2023-08-17 PROCEDURE — 99214 OFFICE O/P EST MOD 30 MIN: CPT | Mod: 95,,, | Performed by: FAMILY MEDICINE

## 2023-08-17 PROCEDURE — 99214 PR OFFICE/OUTPT VISIT, EST, LEVL IV, 30-39 MIN: ICD-10-PCS | Mod: 95,,, | Performed by: FAMILY MEDICINE

## 2023-08-17 NOTE — PROGRESS NOTES
The patient location is: [place]   The chief complaint leading to consultation is: F/U on medical problems    Visit type: audiovisual    Face to Face time with patient: 15 minutes  18 minutes of total time spent on the encounter, which includes face to face time and non-face to face time preparing to see the patient (eg, review of tests), Obtaining and/or reviewing separately obtained history, Documenting clinical information in the electronic or other health record, Independently interpreting results (not separately reported) and communicating results to the patient/family/caregiver, or Care coordination (not separately reported).         Each patient to whom he or she provides medical services by telemedicine is:  (1) informed of the relationship between the physician and patient and the respective role of any other health care provider with respect to management of the patient; and (2) notified that he or she may decline to receive medical services by telemedicine and may withdraw from such care at any time.    Notes:       Subjective:   Approved for the Strensiq. Started that last week.   Might take up to a month to help working.   On it to help with fatigue, bone/ muscle pain.     Major was making her have diarrhea. Stopped taking it a month ago.   Had been eating more food with oil/ grease and having stiool softeners. Also increasing fiber. Has improved GI symptoms.   No blood in the stool.   Will have occasional flare of hemerrhoids.   No nighttime awakenings.  Will have mucous.     On tripleptal and Paxil. Feels like mood has been better.   Still has some issues handling work stress.     Headaches have improved.       Answers submitted by the patient for this visit:  Review of Systems Questionnaire (Submitted on 8/12/2023)  activity change: No  unexpected weight change: No  neck pain: Yes  hearing loss: No  rhinorrhea: Yes  trouble swallowing: No  eye discharge: No  visual disturbance: No  chest  tightness: No  wheezing: No  chest pain: No  palpitations: No  blood in stool: No  constipation: Yes  vomiting: No  diarrhea: No  polydipsia: No  polyuria: No  difficulty urinating: No  hematuria: No  menstrual problem: No  dysuria: No  joint swelling: No  arthralgias: Yes  headaches: Yes  weakness: Yes  confusion: No  dysphoric mood: No    Objective:   General: no apparent distress, seen on video chat  Respiratory: no coughing, able to talk in complete sentences   Neuro: no focal deficits appreciated on video; AAOx3  Psych: happy affect, no pressured speech; good insight, no SI/HI     Assessment/Plan:   1. Hypophosphatasia  Comments:  on Strensiq for the past week; continue following with endocrinology    2. Arthralgia of both hands  Comments:  continue following with endocrinology    3. Low libido  Comments:  potentially from psych medications; trial of OTC Lovin' Libido supplement    4. Chronic idiopathic constipation  Comments:  make sure to increase fiber intake; referring to GI  Orders:  -     Ambulatory referral/consult to Gastroenterology; Future; Expected date: 08/24/2023

## 2023-08-30 DIAGNOSIS — M79.18 MYOFASCIAL PAIN: ICD-10-CM

## 2023-08-30 DIAGNOSIS — M54.50 CHRONIC BILATERAL LOW BACK PAIN WITHOUT SCIATICA: ICD-10-CM

## 2023-08-30 DIAGNOSIS — G89.29 CHRONIC BILATERAL LOW BACK PAIN WITHOUT SCIATICA: ICD-10-CM

## 2023-08-30 RX ORDER — METHOCARBAMOL 750 MG/1
750 TABLET, FILM COATED ORAL 3 TIMES DAILY PRN
Qty: 90 TABLET | Refills: 3 | Status: SHIPPED | OUTPATIENT
Start: 2023-08-30

## 2023-08-30 RX ORDER — METHOCARBAMOL 500 MG/1
TABLET, FILM COATED ORAL
Qty: 30 TABLET | Refills: 1 | Status: SHIPPED | OUTPATIENT
Start: 2023-08-30 | End: 2023-08-30

## 2023-09-08 ENCOUNTER — PATIENT MESSAGE (OUTPATIENT)
Dept: INTERNAL MEDICINE | Facility: CLINIC | Age: 28
End: 2023-09-08
Payer: COMMERCIAL

## 2023-10-03 ENCOUNTER — PATIENT MESSAGE (OUTPATIENT)
Dept: RHEUMATOLOGY | Facility: CLINIC | Age: 28
End: 2023-10-03
Payer: COMMERCIAL

## 2023-10-03 ENCOUNTER — TELEPHONE (OUTPATIENT)
Dept: RHEUMATOLOGY | Facility: CLINIC | Age: 28
End: 2023-10-03
Payer: COMMERCIAL

## 2023-10-03 ENCOUNTER — LAB VISIT (OUTPATIENT)
Dept: LAB | Facility: HOSPITAL | Age: 28
End: 2023-10-03
Attending: PHYSICIAN ASSISTANT
Payer: COMMERCIAL

## 2023-10-03 DIAGNOSIS — R74.8 ELEVATED ALKALINE PHOSPHATASE LEVEL: Primary | ICD-10-CM

## 2023-10-03 DIAGNOSIS — R76.8 ANA POSITIVE: Primary | ICD-10-CM

## 2023-10-03 DIAGNOSIS — R76.8 ANA POSITIVE: ICD-10-CM

## 2023-10-03 LAB
ALBUMIN SERPL BCP-MCNC: 4 G/DL (ref 3.5–5.2)
ALP SERPL-CCNC: >4500 U/L (ref 55–135)
ALT SERPL W/O P-5'-P-CCNC: 13 U/L (ref 10–44)
ANION GAP SERPL CALC-SCNC: 11 MMOL/L (ref 8–16)
AST SERPL-CCNC: 17 U/L (ref 10–40)
BASOPHILS # BLD AUTO: 0.05 K/UL (ref 0–0.2)
BASOPHILS NFR BLD: 0.8 % (ref 0–1.9)
BILIRUB SERPL-MCNC: 0.3 MG/DL (ref 0.1–1)
BUN SERPL-MCNC: 14 MG/DL (ref 6–20)
CALCIUM SERPL-MCNC: 9.2 MG/DL (ref 8.7–10.5)
CHLORIDE SERPL-SCNC: 105 MMOL/L (ref 95–110)
CO2 SERPL-SCNC: 22 MMOL/L (ref 23–29)
CREAT SERPL-MCNC: 0.7 MG/DL (ref 0.5–1.4)
CRP SERPL-MCNC: 4.8 MG/L (ref 0–8.2)
DIFFERENTIAL METHOD: NORMAL
EOSINOPHIL # BLD AUTO: 0.1 K/UL (ref 0–0.5)
EOSINOPHIL NFR BLD: 1.1 % (ref 0–8)
ERYTHROCYTE [DISTWIDTH] IN BLOOD BY AUTOMATED COUNT: 12.4 % (ref 11.5–14.5)
EST. GFR  (NO RACE VARIABLE): >60 ML/MIN/1.73 M^2
GLUCOSE SERPL-MCNC: 92 MG/DL (ref 70–110)
HCT VFR BLD AUTO: 38.3 % (ref 37–48.5)
HGB BLD-MCNC: 12.8 G/DL (ref 12–16)
IMM GRANULOCYTES # BLD AUTO: 0.01 K/UL (ref 0–0.04)
IMM GRANULOCYTES NFR BLD AUTO: 0.2 % (ref 0–0.5)
LYMPHOCYTES # BLD AUTO: 2.9 K/UL (ref 1–4.8)
LYMPHOCYTES NFR BLD: 45.3 % (ref 18–48)
MCH RBC QN AUTO: 30.3 PG (ref 27–31)
MCHC RBC AUTO-ENTMCNC: 33.4 G/DL (ref 32–36)
MCV RBC AUTO: 91 FL (ref 82–98)
MONOCYTES # BLD AUTO: 0.4 K/UL (ref 0.3–1)
MONOCYTES NFR BLD: 6.3 % (ref 4–15)
NEUTROPHILS # BLD AUTO: 2.9 K/UL (ref 1.8–7.7)
NEUTROPHILS NFR BLD: 46.3 % (ref 38–73)
NRBC BLD-RTO: 0 /100 WBC
PLATELET # BLD AUTO: 253 K/UL (ref 150–450)
PMV BLD AUTO: 10.3 FL (ref 9.2–12.9)
POTASSIUM SERPL-SCNC: 4.1 MMOL/L (ref 3.5–5.1)
PROT SERPL-MCNC: 7.1 G/DL (ref 6–8.4)
RBC # BLD AUTO: 4.22 M/UL (ref 4–5.4)
SODIUM SERPL-SCNC: 138 MMOL/L (ref 136–145)
WBC # BLD AUTO: 6.32 K/UL (ref 3.9–12.7)

## 2023-10-03 PROCEDURE — 85652 RBC SED RATE AUTOMATED: CPT | Performed by: PHYSICIAN ASSISTANT

## 2023-10-03 PROCEDURE — 86038 ANTINUCLEAR ANTIBODIES: CPT | Performed by: PHYSICIAN ASSISTANT

## 2023-10-03 PROCEDURE — 86140 C-REACTIVE PROTEIN: CPT | Performed by: PHYSICIAN ASSISTANT

## 2023-10-03 PROCEDURE — 85025 COMPLETE CBC W/AUTO DIFF WBC: CPT | Performed by: PHYSICIAN ASSISTANT

## 2023-10-03 PROCEDURE — 80053 COMPREHEN METABOLIC PANEL: CPT | Performed by: PHYSICIAN ASSISTANT

## 2023-10-03 PROCEDURE — 36415 COLL VENOUS BLD VENIPUNCTURE: CPT | Performed by: PHYSICIAN ASSISTANT

## 2023-10-03 NOTE — TELEPHONE ENCOUNTER
----- Message from Kamaljit Sr MD sent at 10/3/2023  3:49 PM CDT -----  Yes that's normal to have alk phos >4500 when on Strensiq, so nothing to worry about there. You don't need to check bone turnover markers or bone alk phos.    I had obtained X-rays at the initial visit on the areas she complained of pain. Feel free to order additional ones though if she had some additional areas of concern.  ----- Message -----  From: Cherri Sandoval PA-C  Sent: 10/3/2023   3:47 PM CDT  To: Kamaljit Sr MD    Hi Dr. Sr  Pt on Strensiq for hypophosphatasia.  She had a huge jump with her alkaline phosphatase on CMP today.  Lab recorded it as >4500 U/L.  As I am not familiar with this medication, I wanted to notify you and get your input.  I can certainly check her bone turnover markers in addition to long bone x-rays with complaints of bone pain.  Cherri Sandoval PA-C  [facility]  [facility]

## 2023-10-03 NOTE — TELEPHONE ENCOUNTER
Called patient to discuss significant elevation of alkaline phosphatase on today's labs.  She is currently on Strensiq for hypophosphatasia.  This is managed by Dr. Kamaljit Sr.  I will check bone turnover markers in addition to long bone survey.  I have advised patient to follow-up with Dr. Sr regarding significant elevation.  And will send him a message as well.  Patient verbalized understanding and agreed.

## 2023-10-04 LAB
ANA SER QL IF: NORMAL
ERYTHROCYTE [SEDIMENTATION RATE] IN BLOOD BY PHOTOMETRIC METHOD: 5 MM/HR (ref 0–36)

## 2023-10-10 ENCOUNTER — OFFICE VISIT (OUTPATIENT)
Dept: RHEUMATOLOGY | Facility: CLINIC | Age: 28
End: 2023-10-10
Payer: COMMERCIAL

## 2023-10-10 VITALS
HEART RATE: 83 BPM | SYSTOLIC BLOOD PRESSURE: 112 MMHG | DIASTOLIC BLOOD PRESSURE: 68 MMHG | BODY MASS INDEX: 23.25 KG/M2 | WEIGHT: 139.56 LBS | HEIGHT: 65 IN

## 2023-10-10 DIAGNOSIS — M54.50 CHRONIC BILATERAL LOW BACK PAIN WITHOUT SCIATICA: ICD-10-CM

## 2023-10-10 DIAGNOSIS — Z51.81 MEDICATION MONITORING ENCOUNTER: ICD-10-CM

## 2023-10-10 DIAGNOSIS — M79.18 MYOFASCIAL PAIN: Primary | ICD-10-CM

## 2023-10-10 DIAGNOSIS — G89.29 CHRONIC BILATERAL LOW BACK PAIN WITHOUT SCIATICA: ICD-10-CM

## 2023-10-10 PROCEDURE — 99999 PR PBB SHADOW E&M-EST. PATIENT-LVL V: CPT | Mod: PBBFAC,,, | Performed by: PHYSICIAN ASSISTANT

## 2023-10-10 PROCEDURE — 1159F PR MEDICATION LIST DOCUMENTED IN MEDICAL RECORD: ICD-10-PCS | Mod: CPTII,S$GLB,, | Performed by: PHYSICIAN ASSISTANT

## 2023-10-10 PROCEDURE — 1159F MED LIST DOCD IN RCRD: CPT | Mod: CPTII,S$GLB,, | Performed by: PHYSICIAN ASSISTANT

## 2023-10-10 PROCEDURE — 3074F SYST BP LT 130 MM HG: CPT | Mod: CPTII,S$GLB,, | Performed by: PHYSICIAN ASSISTANT

## 2023-10-10 PROCEDURE — 99214 OFFICE O/P EST MOD 30 MIN: CPT | Mod: S$GLB,,, | Performed by: PHYSICIAN ASSISTANT

## 2023-10-10 PROCEDURE — 3074F PR MOST RECENT SYSTOLIC BLOOD PRESSURE < 130 MM HG: ICD-10-PCS | Mod: CPTII,S$GLB,, | Performed by: PHYSICIAN ASSISTANT

## 2023-10-10 PROCEDURE — 99999 PR PBB SHADOW E&M-EST. PATIENT-LVL V: ICD-10-PCS | Mod: PBBFAC,,, | Performed by: PHYSICIAN ASSISTANT

## 2023-10-10 PROCEDURE — 3008F PR BODY MASS INDEX (BMI) DOCUMENTED: ICD-10-PCS | Mod: CPTII,S$GLB,, | Performed by: PHYSICIAN ASSISTANT

## 2023-10-10 PROCEDURE — 99214 PR OFFICE/OUTPT VISIT, EST, LEVL IV, 30-39 MIN: ICD-10-PCS | Mod: S$GLB,,, | Performed by: PHYSICIAN ASSISTANT

## 2023-10-10 PROCEDURE — 3078F DIAST BP <80 MM HG: CPT | Mod: CPTII,S$GLB,, | Performed by: PHYSICIAN ASSISTANT

## 2023-10-10 PROCEDURE — 3008F BODY MASS INDEX DOCD: CPT | Mod: CPTII,S$GLB,, | Performed by: PHYSICIAN ASSISTANT

## 2023-10-10 PROCEDURE — 1160F PR REVIEW ALL MEDS BY PRESCRIBER/CLIN PHARMACIST DOCUMENTED: ICD-10-PCS | Mod: CPTII,S$GLB,, | Performed by: PHYSICIAN ASSISTANT

## 2023-10-10 PROCEDURE — 3078F PR MOST RECENT DIASTOLIC BLOOD PRESSURE < 80 MM HG: ICD-10-PCS | Mod: CPTII,S$GLB,, | Performed by: PHYSICIAN ASSISTANT

## 2023-10-10 PROCEDURE — 1160F RVW MEDS BY RX/DR IN RCRD: CPT | Mod: CPTII,S$GLB,, | Performed by: PHYSICIAN ASSISTANT

## 2023-10-10 NOTE — PROGRESS NOTES
Subjective:      Patient ID: Megan Estrada is a 28 y.o. female.    Chief Complaint: Positive ROBERTO and Disease Management      HPI   Megan Estrada  is a 28 y.o. female here for follow-up on +ROBERTO.  At previous office visit I had low suspicion for connective tissue disease.  She comes today for routine follow-up.  I repeated ROBERTO.  Repeat ROBERTO was negative.      She has complaints of chronic pain in the upper thoracic region and the lumbosacral region.  Also w some back/neck chronically but worsening over the last 3 years.  She also noticed a rash on her chest a few mos ago.  She did 40 mg of prednisone for 5 days in addition to Zantac and Claritin.  Within about 2-3 days it had improved.  Has not recurred.  She was noted to have a positive ROBERTO by primary care.  The titer 160 with a homogeneous pattern.    In past Mobic helped her pain but caused constipation.  I switched her to Celebrex 200 mg daily.  She definitely finds that beneficial.  States pain seems to be worsened with inactivity.  It also worsens if she tries to do too much.  Morning stiffness only lasting about 10 or 15 minutes.  Autoimmune workup has been largely unrevealing.  HLA B27 is negative, inflammatory markers in normal range.  Repeat ROBERTO has been negative.    She has had blood in her urine in the past.  She sees Urology.  They were not concerned.  She has a 1st cousin on her mom's side with rheumatoid arthritis.  Her grandmother had MS.      Patient denies fevers, chills, photosensitivity, eye pain, shortness of breath, chest pain, hematuria, blood in the stool, sicca symptoms, raynauds, finger ulcerations.  Rheumatologic systems otherwise negative.    Serologies/Labs:  Neg anti Scl 70, anti PARKER-1, anti sm/rnp, ssa, ssb, ds dna  ESR/CRP wnl  Neg CCP, RF,  Neg HLA B27  + ROBERTO 1:160 homogenous  Current Treatment:  Na  Previous Treatment:   na      Current Outpatient Medications:     celecoxib (CELEBREX) 200 MG capsule, Take 1 capsule (200 mg total) by mouth  once daily., Disp: 30 capsule, Rfl: 3    erenumab-aooe (AIMOVIG AUTOINJECTOR) 70 mg/mL autoinjector, Inject 70 mg into the skin., Disp: , Rfl:     fluocinonide (LIDEX) 0.05 % external solution, AAA scalp qday - bid prn pruritus, Disp: 60 mL, Rfl: 3    fluticasone propionate (FLONASE) 50 mcg/actuation nasal spray, 1 spray (50 mcg total) by Each Nostril route once daily., Disp: 16 g, Rfl: 2    ketoconazole (NIZORAL) 2 % shampoo, Apply topically once a week. Lather in for 5-10 min before rinsing, Disp: 120 mL, Rfl: 5    methocarbamoL (ROBAXIN) 750 MG Tab, Take 1 tablet (750 mg total) by mouth 3 (three) times daily as needed (pain)., Disp: 90 tablet, Rfl: 3    OXcarbazepine (TRILEPTAL) 300 MG Tab, Take 600 mg by mouth once daily., Disp: , Rfl:     paroxetine (PAXIL) 40 MG tablet, Take 20 mg by mouth once daily., Disp: , Rfl:     spironolactone (ALDACTONE) 50 MG tablet, Take 1 tablet (50 mg total) by mouth once daily., Disp: 90 tablet, Rfl: 3    STRENSIQ 40 mg/mL Soln, Inject into the skin., Disp: , Rfl:     STRENSIQ 80 mg/0.8 mL Soln, Inject into the skin., Disp: , Rfl:     traZODone (DESYREL) 100 MG tablet, Take 100 mg by mouth every evening., Disp: , Rfl:     tretinoin (RETIN-A) 0.05 % cream, Apply topically every evening., Disp: 45 g, Rfl: 11    TRI-SPRINTEC, 28, 0.18/0.215/0.25 mg-35 mcg (28) tablet, Take 1 tablet by mouth., Disp: , Rfl:     ubrogepant (UBRELVY) 100 mg tablet, Take by mouth., Disp: , Rfl:     Past Medical History:   Diagnosis Date    Abnormal Pap smear of cervix     Bipolar disorder     Generalized anxiety disorder     Obsessive-compulsive disorder      Family History   Problem Relation Age of Onset    Bipolar disorder Father      Social History     Socioeconomic History    Marital status: Single   Occupational History     Comment: [employer]   Tobacco Use    Smoking status: Some Days    Smokeless tobacco: Never    Tobacco comments:     Medical Marijuana   Substance and Sexual Activity     "Alcohol use: Not Currently    Drug use: Yes     Types: Marijuana     Comment: occasional     Review of patient's allergies indicates:   Allergen Reactions    Latex        Objective:   /68   Pulse 83   Ht 5' 5" (1.651 m)   Wt 63.3 kg (139 lb 8.8 oz)   LMP 10/10/2023   BMI 23.22 kg/m²   Immunization History   Administered Date(s) Administered    COVID-19, MRNA, LN-S, PF (Pfizer) (Purple Cap) 04/01/2021, 04/21/2021, 12/08/2021    Influenza 10/19/2022    Influenza - Quadrivalent - MDCK - PF 12/08/2021    Influenza - Trivalent (ADULT) 10/13/2010, 10/05/2011    Td (ADULT) 10/24/2021    Zoster Recombinant 07/20/2018       Physical Exam   Constitutional: She is oriented to person, place, and time. No distress.   HENT:   Head: Normocephalic and atraumatic.   Pulmonary/Chest: Effort normal.   Abdominal: She exhibits no distension.   Musculoskeletal:         General: No swelling or tenderness. Normal range of motion.      Cervical back: Normal range of motion.   Lymphadenopathy:     She has no cervical adenopathy.   Neurological: She is alert and oriented to person, place, and time.   Skin: Skin is warm and dry. No rash noted.   Psychiatric: Mood normal.   Nursing note and vitals reviewed.      No active synovitis, enthesitis, dactylitis  Multiple tender trigger points  Sign ttp over the SI joints and in the myofascial region  NVI BUE and BLE  ROM preserved  8/18 tender trigger points noted on exam today    Recent Results (from the past 672 hour(s))   CBC Auto Differential    Collection Time: 10/03/23  1:56 PM   Result Value Ref Range    WBC 6.32 3.90 - 12.70 K/uL    RBC 4.22 4.00 - 5.40 M/uL    Hemoglobin 12.8 12.0 - 16.0 g/dL    Hematocrit 38.3 37.0 - 48.5 %    MCV 91 82 - 98 fL    MCH 30.3 27.0 - 31.0 pg    MCHC 33.4 32.0 - 36.0 g/dL    RDW 12.4 11.5 - 14.5 %    Platelets 253 150 - 450 K/uL    MPV 10.3 9.2 - 12.9 fL    Immature Granulocytes 0.2 0.0 - 0.5 %    Gran # (ANC) 2.9 1.8 - 7.7 K/uL    Immature Grans " (Abs) 0.01 0.00 - 0.04 K/uL    Lymph # 2.9 1.0 - 4.8 K/uL    Mono # 0.4 0.3 - 1.0 K/uL    Eos # 0.1 0.0 - 0.5 K/uL    Baso # 0.05 0.00 - 0.20 K/uL    nRBC 0 0 /100 WBC    Gran % 46.3 38.0 - 73.0 %    Lymph % 45.3 18.0 - 48.0 %    Mono % 6.3 4.0 - 15.0 %    Eosinophil % 1.1 0.0 - 8.0 %    Basophil % 0.8 0.0 - 1.9 %    Differential Method Automated    Comprehensive Metabolic Panel    Collection Time: 10/03/23  1:56 PM   Result Value Ref Range    Sodium 138 136 - 145 mmol/L    Potassium 4.1 3.5 - 5.1 mmol/L    Chloride 105 95 - 110 mmol/L    CO2 22 (L) 23 - 29 mmol/L    Glucose 92 70 - 110 mg/dL    BUN 14 6 - 20 mg/dL    Creatinine 0.7 0.5 - 1.4 mg/dL    Calcium 9.2 8.7 - 10.5 mg/dL    Total Protein 7.1 6.0 - 8.4 g/dL    Albumin 4.0 3.5 - 5.2 g/dL    Total Bilirubin 0.3 0.1 - 1.0 mg/dL    Alkaline Phosphatase >4,500 (H) 55 - 135 U/L    AST 17 10 - 40 U/L    ALT 13 10 - 44 U/L    eGFR >60 >60 mL/min/1.73 m^2    Anion Gap 11 8 - 16 mmol/L   Sedimentation rate    Collection Time: 10/03/23  1:56 PM   Result Value Ref Range    Sed Rate 5 0 - 36 mm/Hr   C-Reactive Protein    Collection Time: 10/03/23  1:56 PM   Result Value Ref Range    CRP 4.8 0.0 - 8.2 mg/L   ROBERTO Screen w/Reflex    Collection Time: 10/03/23  1:56 PM   Result Value Ref Range    ROBERTO Screen Negative <1:80 Negative <1:80     Recent Results (from the past 2016 hour(s))   Phosphorus    Collection Time: 08/11/23  9:42 AM   Result Value Ref Range    Phosphorus 3.4 2.7 - 4.5 mg/dL   PTH, Intact    Collection Time: 08/11/23  9:42 AM   Result Value Ref Range    PTH, Intact 65.3 9.0 - 77.0 pg/mL   Vitamin D    Collection Time: 08/11/23  9:42 AM   Result Value Ref Range    Vit D, 25-Hydroxy 48 30 - 96 ng/mL   Vitamin B6    Collection Time: 08/11/23  9:42 AM   Result Value Ref Range    Vitamin B6 21 5 - 50 ug/L   Comprehensive Metabolic Panel    Collection Time: 08/11/23  9:42 AM   Result Value Ref Range    Sodium 142 136 - 145 mmol/L    Potassium 4.2 3.5 - 5.1 mmol/L     Chloride 106 95 - 110 mmol/L    CO2 25 23 - 29 mmol/L    Glucose 85 70 - 110 mg/dL    BUN 10 6 - 20 mg/dL    Creatinine 0.7 0.5 - 1.4 mg/dL    Calcium 9.1 8.7 - 10.5 mg/dL    Total Protein 6.8 6.0 - 8.4 g/dL    Albumin 3.7 3.5 - 5.2 g/dL    Total Bilirubin 0.3 0.1 - 1.0 mg/dL    Alkaline Phosphatase 943 (H) 55 - 135 U/L    AST 20 10 - 40 U/L    ALT 12 10 - 44 U/L    eGFR >60.0 >60 mL/min/1.73 m^2    Anion Gap 11 8 - 16 mmol/L   CBC Auto Differential    Collection Time: 10/03/23  1:56 PM   Result Value Ref Range    WBC 6.32 3.90 - 12.70 K/uL    RBC 4.22 4.00 - 5.40 M/uL    Hemoglobin 12.8 12.0 - 16.0 g/dL    Hematocrit 38.3 37.0 - 48.5 %    MCV 91 82 - 98 fL    MCH 30.3 27.0 - 31.0 pg    MCHC 33.4 32.0 - 36.0 g/dL    RDW 12.4 11.5 - 14.5 %    Platelets 253 150 - 450 K/uL    MPV 10.3 9.2 - 12.9 fL    Immature Granulocytes 0.2 0.0 - 0.5 %    Gran # (ANC) 2.9 1.8 - 7.7 K/uL    Immature Grans (Abs) 0.01 0.00 - 0.04 K/uL    Lymph # 2.9 1.0 - 4.8 K/uL    Mono # 0.4 0.3 - 1.0 K/uL    Eos # 0.1 0.0 - 0.5 K/uL    Baso # 0.05 0.00 - 0.20 K/uL    nRBC 0 0 /100 WBC    Gran % 46.3 38.0 - 73.0 %    Lymph % 45.3 18.0 - 48.0 %    Mono % 6.3 4.0 - 15.0 %    Eosinophil % 1.1 0.0 - 8.0 %    Basophil % 0.8 0.0 - 1.9 %    Differential Method Automated    Comprehensive Metabolic Panel    Collection Time: 10/03/23  1:56 PM   Result Value Ref Range    Sodium 138 136 - 145 mmol/L    Potassium 4.1 3.5 - 5.1 mmol/L    Chloride 105 95 - 110 mmol/L    CO2 22 (L) 23 - 29 mmol/L    Glucose 92 70 - 110 mg/dL    BUN 14 6 - 20 mg/dL    Creatinine 0.7 0.5 - 1.4 mg/dL    Calcium 9.2 8.7 - 10.5 mg/dL    Total Protein 7.1 6.0 - 8.4 g/dL    Albumin 4.0 3.5 - 5.2 g/dL    Total Bilirubin 0.3 0.1 - 1.0 mg/dL    Alkaline Phosphatase >4,500 (H) 55 - 135 U/L    AST 17 10 - 40 U/L    ALT 13 10 - 44 U/L    eGFR >60 >60 mL/min/1.73 m^2    Anion Gap 11 8 - 16 mmol/L   Sedimentation rate    Collection Time: 10/03/23  1:56 PM   Result Value Ref Range    Sed Rate 5  "0 - 36 mm/Hr   C-Reactive Protein    Collection Time: 10/03/23  1:56 PM   Result Value Ref Range    CRP 4.8 0.0 - 8.2 mg/L   NILE Screen w/Reflex    Collection Time: 10/03/23  1:56 PM   Result Value Ref Range    NILE Screen Negative <1:80 Negative <1:80          No results found for: "TBGOLDPLUS"   No results found for: "HAV", "HEPAIGM", "HEPBIGM", "HEPBCAB", "HBEAG", "HEPCAB"       Assessment:     1. Myofascial pain    2. Chronic bilateral low back pain without sciatica    3. Medication monitoring encounter            Plan:     Megan was seen today for positive nile and disease management.    Diagnoses and all orders for this visit:    Myofascial pain  -     HME - OTHER  -     MRI Sacroiliac Joints W WO Contrast; Future    Chronic bilateral low back pain without sciatica  -     HME - OTHER  -     MRI Sacroiliac Joints W WO Contrast; Future    Medication monitoring encounter        +NILE - repeat negative  No sign CTD today  No evidence end organ damage  If rash returns, would like to get derm input.  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 Sr -   Expected w Strensiq use  No need to check bone turn over markers per his report  Drug therapy requiring intensive monitoring for toxicity  High Risk Medication Monitoring encounter  No current medication related issues, no evidence of toxicity  I ordered labs for toxicity monitoring, have personally reviewed the findings, and discussed them with the patient.  Pending labs will be sent via the portal  Return to clinic:  Pending MRI results    No follow-ups on file.    The patient understands, " chooses and consents to this plan and accepts all the risks which include but are not limited to the risks mentioned above.     Disclaimer: This note was prepared using a voice recognition system and is likely to have sound alike errors within the text.

## 2023-10-23 ENCOUNTER — HOSPITAL ENCOUNTER (OUTPATIENT)
Dept: RADIOLOGY | Facility: HOSPITAL | Age: 28
Discharge: HOME | End: 2023-10-23
Attending: PHYSICIAN ASSISTANT
Payer: COMMERCIAL

## 2023-10-23 DIAGNOSIS — R74.8 ELEVATED ALKALINE PHOSPHATASE LEVEL: ICD-10-CM

## 2023-10-23 PROCEDURE — 77075 RADEX OSSEOUS SURVEY COMPL: CPT | Mod: 26,,, | Performed by: RADIOLOGY

## 2023-10-23 PROCEDURE — 77075 XR LONG BONE SURVEY: ICD-10-PCS | Mod: 26,,, | Performed by: RADIOLOGY

## 2023-10-23 PROCEDURE — 77075 RADEX OSSEOUS SURVEY COMPL: CPT | Mod: TC

## 2023-11-10 ENCOUNTER — HOSPITAL ENCOUNTER (OUTPATIENT)
Dept: RADIOLOGY | Facility: HOSPITAL | Age: 28
Discharge: HOME | End: 2023-11-10
Attending: PHYSICIAN ASSISTANT
Payer: COMMERCIAL

## 2023-11-10 DIAGNOSIS — M54.50 CHRONIC BILATERAL LOW BACK PAIN WITHOUT SCIATICA: ICD-10-CM

## 2023-11-10 DIAGNOSIS — E83.39 HYPOPHOSPHATASIA: Primary | ICD-10-CM

## 2023-11-10 DIAGNOSIS — G89.29 CHRONIC BILATERAL LOW BACK PAIN WITHOUT SCIATICA: ICD-10-CM

## 2023-11-10 DIAGNOSIS — M79.18 MYOFASCIAL PAIN: ICD-10-CM

## 2023-11-10 PROCEDURE — 72197 MRI PELVIS W/O & W/DYE: CPT | Mod: 26,,, | Performed by: RADIOLOGY

## 2023-11-10 PROCEDURE — 72197 MRI PELVIS W/O & W/DYE: CPT | Mod: TC

## 2023-11-10 PROCEDURE — 25500020 PHARM REV CODE 255: Performed by: PHYSICIAN ASSISTANT

## 2023-11-10 PROCEDURE — 72197 MRI SACROILIAC JOINTS WITH AND WITHOUT: ICD-10-PCS | Mod: 26,,, | Performed by: RADIOLOGY

## 2023-11-10 PROCEDURE — A9585 GADOBUTROL INJECTION: HCPCS | Performed by: PHYSICIAN ASSISTANT

## 2023-11-10 RX ORDER — GADOBUTROL 604.72 MG/ML
10 INJECTION INTRAVENOUS
Status: COMPLETED | OUTPATIENT
Start: 2023-11-10 | End: 2023-11-10

## 2023-11-10 RX ORDER — ASFOTASE ALFA 40 MG/ML
SOLUTION SUBCUTANEOUS
Qty: 30 ML | Refills: 3 | Status: SHIPPED | OUTPATIENT
Start: 2023-11-10

## 2023-11-10 RX ORDER — ASFOTASE ALFA 80 MG/.8ML
SOLUTION SUBCUTANEOUS
Qty: 30 ML | Refills: 3 | Status: SHIPPED | OUTPATIENT
Start: 2023-11-10

## 2023-11-10 RX ADMIN — GADOBUTROL 6 ML: 604.72 INJECTION INTRAVENOUS at 02:11

## 2023-11-13 ENCOUNTER — TELEPHONE (OUTPATIENT)
Dept: PAIN MEDICINE | Facility: CLINIC | Age: 28
End: 2023-11-13
Payer: COMMERCIAL

## 2023-11-13 DIAGNOSIS — M54.50 CHRONIC BILATERAL LOW BACK PAIN WITHOUT SCIATICA: Primary | ICD-10-CM

## 2023-11-13 DIAGNOSIS — G89.29 CHRONIC BILATERAL LOW BACK PAIN WITHOUT SCIATICA: Primary | ICD-10-CM

## 2023-11-14 ENCOUNTER — TELEPHONE (OUTPATIENT)
Dept: PAIN MEDICINE | Facility: CLINIC | Age: 28
End: 2023-11-14
Payer: COMMERCIAL

## 2023-11-14 ENCOUNTER — LAB VISIT (OUTPATIENT)
Dept: LAB | Facility: HOSPITAL | Age: 28
End: 2023-11-14
Attending: INTERNAL MEDICINE
Payer: COMMERCIAL

## 2023-11-14 DIAGNOSIS — E83.39 HYPOPHOSPHATASIA: ICD-10-CM

## 2023-11-14 LAB — PHOSPHATE SERPL-MCNC: 2.2 MG/DL (ref 2.7–4.5)

## 2023-11-14 PROCEDURE — 80053 COMPREHEN METABOLIC PANEL: CPT | Performed by: INTERNAL MEDICINE

## 2023-11-14 PROCEDURE — 36415 COLL VENOUS BLD VENIPUNCTURE: CPT | Performed by: INTERNAL MEDICINE

## 2023-11-14 PROCEDURE — 83970 ASSAY OF PARATHORMONE: CPT | Performed by: INTERNAL MEDICINE

## 2023-11-14 PROCEDURE — 84100 ASSAY OF PHOSPHORUS: CPT | Performed by: INTERNAL MEDICINE

## 2023-11-14 NOTE — TELEPHONE ENCOUNTER
Called pt regarding appointment with Dr. Martin on 11/20. No answer, LVM informing pt that Dr. Martin will be out of clinic and we are needing to reschedule.

## 2023-11-15 ENCOUNTER — OFFICE VISIT (OUTPATIENT)
Dept: ENDOCRINOLOGY | Facility: CLINIC | Age: 28
End: 2023-11-15
Payer: COMMERCIAL

## 2023-11-15 DIAGNOSIS — E21.3 HYPERPARATHYROIDISM: ICD-10-CM

## 2023-11-15 DIAGNOSIS — E83.39 HYPOPHOSPHATASIA: Primary | ICD-10-CM

## 2023-11-15 LAB
ALBUMIN SERPL BCP-MCNC: 3.9 G/DL (ref 3.5–5.2)
ALP SERPL-CCNC: >4500 U/L (ref 55–135)
ALT SERPL W/O P-5'-P-CCNC: 19 U/L (ref 10–44)
ANION GAP SERPL CALC-SCNC: 7 MMOL/L (ref 8–16)
AST SERPL-CCNC: 22 U/L (ref 10–40)
BILIRUB SERPL-MCNC: 0.3 MG/DL (ref 0.1–1)
BUN SERPL-MCNC: 11 MG/DL (ref 6–20)
CALCIUM SERPL-MCNC: 9 MG/DL (ref 8.7–10.5)
CHLORIDE SERPL-SCNC: 105 MMOL/L (ref 95–110)
CO2 SERPL-SCNC: 28 MMOL/L (ref 23–29)
CREAT SERPL-MCNC: 0.7 MG/DL (ref 0.5–1.4)
EST. GFR  (NO RACE VARIABLE): >60 ML/MIN/1.73 M^2
GLUCOSE SERPL-MCNC: 74 MG/DL (ref 70–110)
POTASSIUM SERPL-SCNC: 4.4 MMOL/L (ref 3.5–5.1)
PROT SERPL-MCNC: 7 G/DL (ref 6–8.4)
PTH-INTACT SERPL-MCNC: 99.8 PG/ML (ref 9–77)
SODIUM SERPL-SCNC: 140 MMOL/L (ref 136–145)

## 2023-11-15 PROCEDURE — 99214 PR OFFICE/OUTPT VISIT, EST, LEVL IV, 30-39 MIN: ICD-10-PCS | Mod: 95,,, | Performed by: INTERNAL MEDICINE

## 2023-11-15 PROCEDURE — 99214 OFFICE O/P EST MOD 30 MIN: CPT | Mod: 95,,, | Performed by: INTERNAL MEDICINE

## 2023-11-15 NOTE — ASSESSMENT & PLAN NOTE
Differential includes early primary hyperparathyroidism (normocalcemic) versus secondary hyperparathyroidism. Will monitor PTH and calcium q6 months for now.

## 2023-11-15 NOTE — ASSESSMENT & PLAN NOTE
She's had significant symptomatic and quality of life improvement since starting on Strensiq.    Continue Strensiq therapy at 2 mg/kg (120 mg) x 3 days/week    [place] filled [place]. May require periodic absences based on symptoms.     HPP labs/imaging:     Serum calcium, phosphorus, PTH, Vitamin D, Vitamin B6 - 3 months after starting treatment, then annually  DXA scan every 5 years; noting that anti-resorptive treatments should be avoided.  Yearly renal ultrasound  Yearly eye exams

## 2023-11-15 NOTE — PROGRESS NOTES
FOLLOW-UP PATIENT VISIT    Subjective:      Chief Complaint:  Hypophosphatasia    HPI: Megan Estrada is a 28 y.o. female who is here for a follow-up evaluation hypophosphatasia      Past Medical History:   Diagnosis Date    Abnormal Pap smear of cervix     Bipolar disorder     Generalized anxiety disorder     Obsessive-compulsive disorder      With regards to hypophosphatasia:    Updates 11/15/2023:  She's been on Strensiq now for 3 months. She has noticed significant improvements in pain and fatigue since being on medication. She ran out of syringes for about 10 days and the pain and fatigue symptoms returned. She's been back on Strensiq for the past two weeks and is feeling better from that standpoint. She's noticed that a cavity that she's been having for about a year has started to heal. It used to be very painful to eat foods, but this is better. She hasn't noted improvement in muscle strength just yet.     Strensiq dose: 120 mg x 3 doses/week.    Noted mild elevation in PTH again and low serum phosphorus. Calcium is normal.    ____  At the initial visit, she was referred for evaluation for adrenal insufficiency. She had a low 8AM cortisol, but it was likely drawn while she was on prednisone. We repeated an 8AM cortisol which came back at 13.5 along with ACTH of 20, essentially ruling out adrenal insufficiency. We investigated the finding of chronically low serum alkaline phosphatase. B6 was on the high end and urine PEA was normal. ALPL genetic mutation testing was negative. Of note, her mom had low alkaline phosphatase x 1 on labs from 2020, but has otherwise had normal ALP. Her mom has fatigue, but she doesn't think she has the other musculoskeletal type symptoms. Dad has normal alkaline phosphatase.       She provided a detailed letter on symptomatology related to low alkaline phosphatase.    She started on Strensiq on 8/10/2023        Outside labs:                  Lab Results   Component Value Date     "ALKALINEPHOS 5.3 01/25/2023    ALKPHOS >4,500 (H) 11/14/2023    ALKPHOS >4,500 (H) 10/03/2023    ALKPHOS 943 (H) 08/11/2023    VITAMINB6 21 08/11/2023    VITAMINB6 43 01/25/2023         FH: grandfather and grand uncle - lung cancer; cervical/breast cancer  SH: No tobacco, social EtOH; works as a     Takes spironolactone 50 mg daily, but usually only does this when her acne is flaring. She is not taking it currently. She has been on OCPS (currently Tri-lo-Sprintec - norgestimate and ethinyl estradiol) since age 12 for abnormal cycles. Her OBGYN has diagnosed her with PCOS. She denies hirsutism or other signs of virilization. +Acne. Had transvag U/S at some point, but she's not sure the result of that test.      Reviewed past medical, family, social history and updated as appropriate.    Objective:     There were no vitals filed for this visit.        BP Readings from Last 5 Encounters:   10/10/23 112/68   08/11/23 112/73   06/08/23 111/72   05/19/23 114/66   04/20/23 113/74         Physical Exam      Wt Readings from Last 30 Encounters:   10/10/23 1406 63.3 kg (139 lb 8.8 oz)   08/11/23 0910 62.5 kg (137 lb 12.6 oz)   06/08/23 1336 59.9 kg (132 lb 0.9 oz)   05/19/23 1745 59 kg (130 lb)   04/20/23 1213 58 kg (127 lb 13.9 oz)   03/22/23 1624 58.2 kg (128 lb 4.9 oz)   01/17/23 1030 55.7 kg (122 lb 12.7 oz)   01/06/23 1406 57.4 kg (126 lb 8.7 oz)   11/22/22 0818 56.2 kg (123 lb 14.4 oz)   12/24/20 1516 54.4 kg (120 lb)       No results found for: "HGBA1C"  No results found for: "CHOL", "HDL", "LDLCALC", "TRIG", "CHOLHDL"  Lab Results   Component Value Date     11/14/2023    K 4.4 11/14/2023     11/14/2023    CO2 28 11/14/2023    GLU 74 11/14/2023    BUN 11 11/14/2023    CREATININE 0.7 11/14/2023    CALCIUM 9.0 11/14/2023    PROT 7.0 11/14/2023    ALBUMIN 3.9 11/14/2023    BILITOT 0.3 11/14/2023    ALKPHOS >4,500 (H) 11/14/2023    AST 22 11/14/2023    ALT 19 11/14/2023    ANIONGAP 7 (L) " "11/14/2023    TSH 0.715 03/22/2023      No results found for: "MICALBCREAT"    Assessment/Plan:     Hypophosphatasia  She's had significant symptomatic and quality of life improvement since starting on Strensiq.    Continue Strensiq therapy at 2 mg/kg (120 mg) x 3 days/week    FMLA paperwork filled out. May require periodic absences based on symptoms.     HPP labs/imaging:     Serum calcium, phosphorus, PTH, Vitamin D, Vitamin B6 - 3 months after starting treatment, then annually  DXA scan every 5 years; noting that anti-resorptive treatments should be avoided.  Yearly renal ultrasound  Yearly eye exams      Hyperparathyroidism  Differential includes early primary hyperparathyroidism (normocalcemic) versus secondary hyperparathyroidism. Will monitor PTH and calcium q6 months for now.    The patient location is: home  The chief complaint leading to consultation is: hypophosphatasia    Visit type: audiovisual    Face to Face time with patient: 20 minutes of total time spent on the encounter, which includes face to face time and non-face to face time preparing to see the patient (eg, review of tests), Obtaining and/or reviewing separately obtained history, Documenting clinical information in the electronic or other health record, Independently interpreting results (not separately reported) and communicating results to the patient/family/caregiver, or Care coordination (not separately reported).     Each patient to whom he or she provides medical services by telemedicine is:  (1) informed of the relationship between the physician and patient and the respective role of any other health care provider with respect to management of the patient; and (2) notified that he or she may decline to receive medical services by telemedicine and may withdraw from such care at any time.    Notes:     Follow up in about 6 months (around 5/15/2024).  Labs prior to follow-up.    "

## 2023-11-28 ENCOUNTER — OFFICE VISIT (OUTPATIENT)
Dept: GASTROENTEROLOGY | Facility: CLINIC | Age: 28
End: 2023-11-28
Payer: COMMERCIAL

## 2023-11-28 VITALS — BODY MASS INDEX: 22.49 KG/M2 | WEIGHT: 135 LBS | HEIGHT: 65 IN

## 2023-11-28 DIAGNOSIS — R11.0 NAUSEA: ICD-10-CM

## 2023-11-28 DIAGNOSIS — R14.0 FLATULENCE/GAS PAIN/BELCHING: ICD-10-CM

## 2023-11-28 DIAGNOSIS — K59.09 CHRONIC CONSTIPATION: Primary | ICD-10-CM

## 2023-11-28 DIAGNOSIS — R10.84 GENERALIZED ABDOMINAL PAIN: ICD-10-CM

## 2023-11-28 PROCEDURE — 1159F PR MEDICATION LIST DOCUMENTED IN MEDICAL RECORD: ICD-10-PCS | Mod: CPTII,95,, | Performed by: PHYSICIAN ASSISTANT

## 2023-11-28 PROCEDURE — 99203 OFFICE O/P NEW LOW 30 MIN: CPT | Mod: 95,,, | Performed by: PHYSICIAN ASSISTANT

## 2023-11-28 PROCEDURE — 3008F BODY MASS INDEX DOCD: CPT | Mod: CPTII,95,, | Performed by: PHYSICIAN ASSISTANT

## 2023-11-28 PROCEDURE — 1159F MED LIST DOCD IN RCRD: CPT | Mod: CPTII,95,, | Performed by: PHYSICIAN ASSISTANT

## 2023-11-28 PROCEDURE — 99203 PR OFFICE/OUTPT VISIT, NEW, LEVL III, 30-44 MIN: ICD-10-PCS | Mod: 95,,, | Performed by: PHYSICIAN ASSISTANT

## 2023-11-28 PROCEDURE — 3008F PR BODY MASS INDEX (BMI) DOCUMENTED: ICD-10-PCS | Mod: CPTII,95,, | Performed by: PHYSICIAN ASSISTANT

## 2023-11-28 NOTE — PROGRESS NOTES
GI OUTPATIENT NOTE    PCP: Sabrina Wagner No address on file    Chief Complaint   Patient presents with    Constipation     The patient location is: [place]  The chief complaint leading to consultation is: See above    Visit type: audiovisual    Face to Face time with patient: 20 minutes    23 minutes of total time spent on the encounter, which includes face to face time and non-face to face time preparing to see the patient (eg, review of tests), Obtaining and/or reviewing separately obtained history, Documenting clinical information in the electronic or other health record, Independently interpreting results (not separately reported) and communicating results to the patient/family/caregiver, or Care coordination (not separately reported).         Each patient to whom he or she provides medical services by telemedicine is:  (1) informed of the relationship between the physician and patient and the respective role of any other health care provider with respect to management of the patient; and (2) notified that he or she may decline to receive medical services by telemedicine and may withdraw from such care at any time.    Notes:     HISTORY OF PRESENT ILLNESS:  28 y.o. female presents to the GI clinic today for initial evaluation. The patient complains of constipation. She reports having stomach problems since she was little and says it runs in her family. She often goes a week without a BM and once went two weeks. When she goes a while without a BM, she gets nauseated, abdominal pain, gas and belching. More recently she has developed hemorrhoids. She has been to urgent care 2-3 times for the abdominal pain, and they tell her she is constipated. They have recommended increasing her fiber. Her PCP put her on Linzess 145 mcg last year but it gave her diarrhea for two weeks. She didn't continue it. In April this year it was decreased to 72 mcg, but didn't work very well. She thinks she has some still at home. She has  gotten to the point where she uses a glycerin suppository almost daily to help her get at least a little stool out. She denies hematochezia, melena, weight loss or vomiting. She does have intermittent regurgitation with acid foods and overeating. She has tried to improve this habit and has noticed decreased frequency in sx. She has never had an EGD or colonoscopy.     She denies regular use of fiber or exercise. Occasionally she will take Fiber One with prebiotics.     Past Medical History:   Diagnosis Date    Abnormal Pap smear of cervix     Bipolar disorder     Generalized anxiety disorder     Obsessive-compulsive disorder        Past Surgical History:   Procedure Laterality Date    CERVICAL BIOPSY  W/ LOOP ELECTRODE EXCISION      lipo on chin         Family History   Problem Relation Age of Onset    Bipolar disorder Father        MEDS/ALLERGY:  The patient's medications and allergies were reviewed and updated in the EPIC chart.     Review of Systems  As per HPI. She is prone to care sickness. She has frequent hiccups that are random. History of migraines which can cause n/v.     Physical Exam  Constitutional:       General: She is not in acute distress.     Appearance: She is well-developed.   HENT:      Head: Normocephalic and atraumatic.   Pulmonary:      Effort: Pulmonary effort is normal. No respiratory distress.   Neurological:      Mental Status: She is alert and oriented to person, place, and time.      Cranial Nerves: No cranial nerve deficit.   Psychiatric:         Behavior: Behavior normal.         LABS/IMAGING:   Pertinent results were reviewed.     ASSESSMENT:  1. Chronic constipation    2. Generalized abdominal pain    3. Flatulence/gas pain/belching    4. Nausea        PLAN:  Take one Dulcolax or a bottle of Magnesium Citrate.   The following day, start Linzess 72 mcg daily and Miralax 17 grams daily.   I will send a new prescription for the Linzess to the pharmacy in case you don't have enough at  home.   Include some fiber in your diet daily.   Make sure you are drinking at least 1-2 glasses of water daily if your not already doing so.   Follow up in four weeks so we can review your response and make adjustments if needed.     Follow up in about 4 weeks (around 12/26/2023).     Thank you for the opportunity to participate in the care of this patient. This consult was designated to me by my supervising physician.   Dean Lopez PA-C.

## 2023-11-28 NOTE — PATIENT INSTRUCTIONS
Take one Dulcolax or a bottle of Magnesium Citrate.   The following day, start Linzess 72 mcg daily and Miralax 17 grams daily.   I will send a new prescription for the Linzess to the pharmacy in case you don't have enough at home.   Include some fiber in your diet daily.   Make sure you are drinking at least 1-2 glasses of water daily if your not already doing so.   Follow up in four weeks so we can review your response and make adjustments if needed.

## 2023-11-29 ENCOUNTER — TELEPHONE (OUTPATIENT)
Dept: PAIN MEDICINE | Facility: CLINIC | Age: 28
End: 2023-11-29

## 2023-11-29 NOTE — TELEPHONE ENCOUNTER
Reached out to patient to reschedule appointment because the provider will be out of clinic.  Apt has been made . All questions answered.     Chris Hall  Medical

## 2024-01-02 ENCOUNTER — OFFICE VISIT (OUTPATIENT)
Dept: GASTROENTEROLOGY | Facility: CLINIC | Age: 29
End: 2024-01-02

## 2024-01-02 DIAGNOSIS — K59.04 CHRONIC IDIOPATHIC CONSTIPATION: ICD-10-CM

## 2024-01-02 PROCEDURE — 99213 OFFICE O/P EST LOW 20 MIN: CPT | Mod: 95,,, | Performed by: NURSE PRACTITIONER

## 2024-01-02 RX ORDER — LUBIPROSTONE 24 UG/1
24 CAPSULE ORAL 2 TIMES DAILY WITH MEALS
Qty: 60 CAPSULE | Refills: 11 | Status: SHIPPED | OUTPATIENT
Start: 2024-01-02 | End: 2025-01-01

## 2024-01-02 NOTE — PROGRESS NOTES
Clinic Follow Up:  [facility] Follow Up Note    Reason for Follow Up:  The encounter diagnosis was Chronic idiopathic constipation.    PCP: Sabrina Wagner   [id number] [facility] / [place] [id number]    HPI:  This is a 28 y.o. female here for follow up of constipation.   She was seen 1 month ago for constipation. She was previously on Linzess 145 mcg daily but was causing too much diarrhea. It was decreased to Linzess 72 mcg and Miralax daily. This has not been strong enough for her.     Review of Systems   Constitutional:  Negative for activity change and appetite change.        As per interval history above   Respiratory:  Negative for cough and shortness of breath.    Cardiovascular:  Negative for chest pain.   Gastrointestinal:  Positive for constipation. Negative for anal bleeding, blood in stool, diarrhea, nausea, rectal pain and vomiting.   Skin:  Negative for color change and rash.       Medical History:  Past Medical History:   Diagnosis Date    Abnormal Pap smear of cervix     Bipolar disorder     Generalized anxiety disorder     Obsessive-compulsive disorder        Surgical History:   Past Surgical History:   Procedure Laterality Date    CERVICAL BIOPSY  W/ LOOP ELECTRODE EXCISION      lipo on chin         Family History:   Family History   Problem Relation Age of Onset    Bipolar disorder Father        Social History:   Social History     Tobacco Use    Smoking status: Some Days    Smokeless tobacco: Never    Tobacco comments:     Medical Marijuana   Substance Use Topics    Alcohol use: Not Currently    Drug use: Yes     Types: Marijuana     Comment: occasional       Allergies:   Review of patient's allergies indicates:   Allergen Reactions    Latex Itching     By contact only       Home Medications:  Current Outpatient Medications on File Prior to Visit   Medication Sig Dispense Refill    celecoxib (CELEBREX) 200 MG capsule TAKE 1 CAPSULE(200 MG) BY MOUTH EVERY DAY 90 capsule 1     erenumab-aooe (AIMOVIG AUTOINJECTOR) 70 mg/mL autoinjector Inject 70 mg into the skin.      fluocinonide (LIDEX) 0.05 % external solution AAA scalp qday - bid prn pruritus 60 mL 3    fluticasone propionate (FLONASE) 50 mcg/actuation nasal spray 1 spray (50 mcg total) by Each Nostril route once daily. 16 g 2    ketoconazole (NIZORAL) 2 % shampoo Apply topically once a week. Lather in for 5-10 min before rinsing 120 mL 5    methocarbamoL (ROBAXIN) 750 MG Tab Take 1 tablet (750 mg total) by mouth 3 (three) times daily as needed (pain). 90 tablet 3    OXcarbazepine (OXTELLAR XR) 600 mg Tb24 Take 600 mg by mouth once daily.      paroxetine (PAXIL) 20 MG tablet Take 20 mg by mouth once daily.      spironolactone (ALDACTONE) 50 MG tablet Take 1 tablet (50 mg total) by mouth once daily. 90 tablet 3    STRENSIQ 40 mg/mL Soln Inject 120 mg daily on M-W-F 30 mL 3    STRENSIQ 80 mg/0.8 mL Soln Inject 120 mg daily on M-W-F 30 mL 3    traZODone (DESYREL) 100 MG tablet Take 100 mg by mouth every evening.      tretinoin (RETIN-A) 0.05 % cream Apply topically every evening. 45 g 11    TRI-SPRINTEC, 28, 0.18/0.215/0.25 mg-35 mcg (28) tablet Take 1 tablet by mouth.      ubrogepant (UBRELVY) 100 mg tablet Take by mouth.      [DISCONTINUED] linaCLOtide (LINZESS) 72 mcg Cap capsule Take 1 capsule (72 mcg total) by mouth before breakfast. 30 capsule 1     No current facility-administered medications on file prior to visit.       There were no vitals taken for this visit.  There is no height or weight on file to calculate BMI.  Physical Exam  Constitutional:       General: She is not in acute distress.  HENT:      Head: Normocephalic.   Neurological:      General: No focal deficit present.      Mental Status: She is alert.   Psychiatric:         Mood and Affect: Mood normal.         Judgment: Judgment normal.         Labs: Pertinent labs reviewed.  CRC Screening: NA    Assessment:   1. Chronic idiopathic constipation         Recommendations:   - stop Linzess  - trial of Amitiza instead.     Chronic idiopathic constipation  Comments:  make sure to increase fiber intake; referring to GI  Orders:  -     Ambulatory referral/consult to Gastroenterology  -     lubiprostone (AMITIZA) 24 MCG Cap; Take 1 capsule (24 mcg total) by mouth 2 (two) times daily with meals.  Dispense: 60 capsule; Refill: 11    Return to Clinic:  Follow up if symptoms worsen or fail to improve.    Thank you for the opportunity to participate in the care of this patient.  CATALINA Gastelum

## 2024-02-02 ENCOUNTER — PATIENT MESSAGE (OUTPATIENT)
Dept: RHEUMATOLOGY | Facility: CLINIC | Age: 29
End: 2024-02-02
Payer: COMMERCIAL

## 2024-05-02 ENCOUNTER — LAB VISIT (OUTPATIENT)
Dept: LAB | Facility: HOSPITAL | Age: 29
End: 2024-05-02
Attending: INTERNAL MEDICINE
Payer: COMMERCIAL

## 2024-05-02 DIAGNOSIS — E83.39 HYPOPHOSPHATASIA: ICD-10-CM

## 2024-05-02 LAB — PHOSPHATE SERPL-MCNC: 3.3 MG/DL (ref 2.7–4.5)

## 2024-05-02 PROCEDURE — 84100 ASSAY OF PHOSPHORUS: CPT | Performed by: INTERNAL MEDICINE

## 2024-05-02 PROCEDURE — 82306 VITAMIN D 25 HYDROXY: CPT | Performed by: INTERNAL MEDICINE

## 2024-05-02 PROCEDURE — 83970 ASSAY OF PARATHORMONE: CPT | Performed by: INTERNAL MEDICINE

## 2024-05-02 PROCEDURE — 36415 COLL VENOUS BLD VENIPUNCTURE: CPT | Performed by: INTERNAL MEDICINE

## 2024-05-02 PROCEDURE — 80053 COMPREHEN METABOLIC PANEL: CPT | Performed by: INTERNAL MEDICINE

## 2024-05-03 LAB
25(OH)D3+25(OH)D2 SERPL-MCNC: 43 NG/ML (ref 30–96)
ALBUMIN SERPL BCP-MCNC: 3.8 G/DL (ref 3.5–5.2)
ALP SERPL-CCNC: >4500 U/L (ref 55–135)
ALT SERPL W/O P-5'-P-CCNC: 14 U/L (ref 10–44)
ANION GAP SERPL CALC-SCNC: 7 MMOL/L (ref 8–16)
AST SERPL-CCNC: 17 U/L (ref 10–40)
BILIRUB SERPL-MCNC: 0.2 MG/DL (ref 0.1–1)
BUN SERPL-MCNC: 13 MG/DL (ref 6–20)
CALCIUM SERPL-MCNC: 9.5 MG/DL (ref 8.7–10.5)
CHLORIDE SERPL-SCNC: 103 MMOL/L (ref 95–110)
CO2 SERPL-SCNC: 26 MMOL/L (ref 23–29)
CREAT SERPL-MCNC: 0.8 MG/DL (ref 0.5–1.4)
EST. GFR  (NO RACE VARIABLE): >60 ML/MIN/1.73 M^2
GLUCOSE SERPL-MCNC: 85 MG/DL (ref 70–110)
POTASSIUM SERPL-SCNC: 4.1 MMOL/L (ref 3.5–5.1)
PROT SERPL-MCNC: 6.9 G/DL (ref 6–8.4)
PTH-INTACT SERPL-MCNC: 73.4 PG/ML (ref 9–77)
SODIUM SERPL-SCNC: 136 MMOL/L (ref 136–145)

## 2024-06-14 DIAGNOSIS — G89.29 CHRONIC BILATERAL LOW BACK PAIN WITHOUT SCIATICA: ICD-10-CM

## 2024-06-14 DIAGNOSIS — M54.50 CHRONIC BILATERAL LOW BACK PAIN WITHOUT SCIATICA: ICD-10-CM

## 2024-06-14 DIAGNOSIS — M79.18 MYOFASCIAL PAIN: ICD-10-CM

## 2024-06-17 ENCOUNTER — TELEPHONE (OUTPATIENT)
Dept: RHEUMATOLOGY | Facility: CLINIC | Age: 29
End: 2024-06-17
Payer: COMMERCIAL

## 2024-06-17 RX ORDER — CELECOXIB 200 MG/1
200 CAPSULE ORAL DAILY
Qty: 90 CAPSULE | Refills: 1 | Status: SHIPPED | OUTPATIENT
Start: 2024-06-17

## 2024-06-27 NOTE — PROGRESS NOTES
FOLLOW-UP PATIENT VISIT    Subjective:      Chief Complaint:  Hypophosphatasia    HPI: Megan Estrada is a 29 y.o. 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      With regards to hypophosphatasia:  Updates 6/28/2024:  Now on Strensiq since 8/2024. She's still doing well. She can tell when she misses a dose due to increased pain. She has missed doses on occasion when her depression is acting up, but has mostly been adherent. She has been having some joint aches and is taking Celebrex for that daily. She attributes some of the achiness to lack of physical activity. She's been seeing her psychiatrist regularly regarding bipolar disorder and is looking for a talk therapist.        11/15/2023:  She's been on Strensiq now for 3 months. She has noticed significant improvements in pain and fatigue since being on medication. She ran out of syringes for about 10 days and the pain and fatigue symptoms returned. She's been back on Strensiq for the past two weeks and is feeling better from that standpoint. She's noticed that a cavity that she's been having for about a year has started to heal. It used to be very painful to eat foods, but this is better. She hasn't noted improvement in muscle strength just yet.     Strensiq dose: 120 mg x 3 doses/week.    Noted mild elevation in PTH again and low serum phosphorus. Calcium is normal.    ____  At the initial visit, she was referred for evaluation for adrenal insufficiency. She had a low 8AM cortisol, but it was likely drawn while she was on prednisone. We repeated an 8AM cortisol which came back at 13.5 along with ACTH of 20, essentially ruling out adrenal insufficiency. We investigated the finding of chronically low serum  alkaline phosphatase. B6 was on the high end and urine PEA was normal. ALPL genetic mutation testing was negative. Of note, her mom had low alkaline phosphatase x 1 on labs from 2020, but has otherwise had normal ALP. Her mom has fatigue, but she doesn't think she has the other musculoskeletal type symptoms. Dad has normal alkaline phosphatase.       She provided a detailed letter on symptomatology related to low alkaline phosphatase.    She started on Strensiq on 8/10/2023        Outside labs:                  Lab Results   Component Value Date    ALKALINEPHOS 5.3 01/25/2023    ALKPHOS >4,500 (H) 05/02/2024    ALKPHOS >4,500 (H) 11/14/2023    ALKPHOS >4,500 (H) 10/03/2023    VITAMINB6 21 08/11/2023    VITAMINB6 43 01/25/2023         FH: grandfather and grand uncle - lung cancer; cervical/breast cancer  SH: No tobacco, social EtOH; works as a     Takes spironolactone 50 mg daily, but usually only does this when her acne is flaring. She is not taking it currently. She has been on OCPS (currently Tri-lo-Sprintec - norgestimate and ethinyl estradiol) since age 12 for abnormal cycles. Her OBGYN has diagnosed her with PCOS. She denies hirsutism or other signs of virilization. +Acne. Had transvag U/S at some point, but she's not sure the result of that test.      Reviewed past medical, family, social history and updated as appropriate.    Objective:     Vitals:    06/28/24 1350   BP: 108/71   Pulse: (P) 101           BP Readings from Last 5 Encounters:   06/28/24 108/71   10/10/23 112/68   08/11/23 112/73   06/08/23 111/72   05/19/23 114/66         Physical Exam  Vitals and nursing note reviewed.   Constitutional:       General: She is not in acute distress.     Appearance: She is well-developed. She is not ill-appearing.   HENT:      Head: Normocephalic and atraumatic.   Neck:      Thyroid: No thyromegaly.      Trachea: No tracheal deviation.   Pulmonary:      Effort: Pulmonary effort is normal. No  "respiratory distress.   Neurological:      Mental Status: She is alert and oriented to person, place, and time.      Coordination: Coordination normal.   Psychiatric:         Mood and Affect: Mood normal.         Behavior: Behavior normal.         Thought Content: Thought content normal.           Wt Readings from Last 30 Encounters:   06/28/24 1350 66 kg (145 lb 8.1 oz)   11/28/23 1430 61.2 kg (135 lb)   10/10/23 1406 63.3 kg (139 lb 8.8 oz)   08/11/23 0910 62.5 kg (137 lb 12.6 oz)   06/08/23 1336 59.9 kg (132 lb 0.9 oz)   05/19/23 1745 59 kg (130 lb)   04/20/23 1213 58 kg (127 lb 13.9 oz)   03/22/23 1624 58.2 kg (128 lb 4.9 oz)   01/17/23 1030 55.7 kg (122 lb 12.7 oz)   01/06/23 1406 57.4 kg (126 lb 8.7 oz)   11/22/22 0818 56.2 kg (123 lb 14.4 oz)   12/24/20 1516 54.4 kg (120 lb)       No results found for: "HGBA1C"  No results found for: "CHOL", "HDL", "LDLCALC", "TRIG", "CHOLHDL"  Lab Results   Component Value Date     05/02/2024    K 4.1 05/02/2024     05/02/2024    CO2 26 05/02/2024    GLU 85 05/02/2024    BUN 13 05/02/2024    CREATININE 0.8 05/02/2024    CALCIUM 9.5 05/02/2024    PROT 6.9 05/02/2024    ALBUMIN 3.8 05/02/2024    BILITOT 0.2 05/02/2024    ALKPHOS >4,500 (H) 05/02/2024    AST 17 05/02/2024    ALT 14 05/02/2024    ANIONGAP 7 (L) 05/02/2024    TSH 0.715 03/22/2023      No results found for: "MICALBCREAT"    Assessment/Plan:     Hypophosphatasia  She's had significant symptomatic and quality of life improvement since starting on Strensiq.    Continue Strensiq therapy at 2 mg/kg (120 mg) x 3 days/week      I will see her back in 6 months. Will get labs and kidney ultrasound prior to that visit. Will also be due for eye exam.      Notes:     Follow up in about 6 months (around 12/28/2024).  Labs and U/S prior to follow-up in 6 months.    "

## 2024-06-28 ENCOUNTER — OFFICE VISIT (OUTPATIENT)
Dept: ENDOCRINOLOGY | Facility: CLINIC | Age: 29
End: 2024-06-28
Payer: COMMERCIAL

## 2024-06-28 VITALS — WEIGHT: 145.5 LBS | BODY MASS INDEX: 24.21 KG/M2 | DIASTOLIC BLOOD PRESSURE: 71 MMHG | SYSTOLIC BLOOD PRESSURE: 108 MMHG

## 2024-06-28 DIAGNOSIS — E83.39 HYPOPHOSPHATASIA: Primary | ICD-10-CM

## 2024-06-28 PROBLEM — E21.3 HYPERPARATHYROIDISM: Status: RESOLVED | Noted: 2023-11-15 | Resolved: 2024-06-28

## 2024-06-28 PROCEDURE — 99999 PR PBB SHADOW E&M-EST. PATIENT-LVL III: CPT | Mod: PBBFAC,,, | Performed by: INTERNAL MEDICINE

## 2024-06-28 NOTE — ASSESSMENT & PLAN NOTE
She's had significant symptomatic and quality of life improvement since starting on Strensiq.    Continue Strensiq therapy at 2 mg/kg (120 mg) x 3 days/week      I will see her back in 6 months. Will get labs and kidney ultrasound prior to that visit. Will also be due for eye exam.

## 2024-07-15 ENCOUNTER — PATIENT MESSAGE (OUTPATIENT)
Dept: ENDOCRINOLOGY | Facility: CLINIC | Age: 29
End: 2024-07-15
Payer: COMMERCIAL

## 2024-08-22 ENCOUNTER — PATIENT MESSAGE (OUTPATIENT)
Dept: ENDOCRINOLOGY | Facility: CLINIC | Age: 29
End: 2024-08-22
Payer: COMMERCIAL

## 2024-08-30 ENCOUNTER — PATIENT MESSAGE (OUTPATIENT)
Dept: ENDOCRINOLOGY | Facility: CLINIC | Age: 29
End: 2024-08-30
Payer: COMMERCIAL

## 2024-09-16 NOTE — PROGRESS NOTES
"New Patient Chronic Pain Note (Initial Visit)    Referring Physician: Krystin Campos    PCP: Sabrina Wagner MD    Chief Complaint:   Chief Complaint   Patient presents with    new patient     Back pain and neck pain        SUBJECTIVE:    Megan Estrada is a 29 y.o. female with past medical history significant for migraine headaches, anxiety/bipolar disorder, obsessive-compulsive disorder, hypophosphatasia who presents to the clinic for the evaluation of diffuse pain in the neck, between her shoulders, lower back.    Of note patient follows with endocrinology Dr. Sr four hypophosphatemia with diffuse pain, most recently seen 06/27/2024 with the following evaluation:    Today her primary concern is diffuse "muscle spasms everywhere." Patient has longstanding history of hypophosphatemia with muscle pain, stiffness and painful bones," exceeding 4 years in duration.  Today she reports pain may be more severe in the neck in bilateral cervical paraspinous musculature, midthoracic territory as well as acute pain in her shoulder without precipitating accident or injury.  Pain in the neck can be exacerbated with cervical lateral rotation.  She denies significant upper extremity weakness today or compromise in hand  strength or dexterity.  Patient does use medicinal marijuana which she believes helps with her pain but uncertain medications can amplify her pain where she was able to feel pain in numerous areas throughout her body.Patient has received no prior trigger point or interventional treatment under fluoroscopy for neck, mid or lower back or joint pain.  Patient is currently not maintained on any membrane stabilizing agents.    Chart review reveals history of multiple prior fractures including ring finger, wrist, foot and left ankle.   Of note patient follows with Rheumatology, with unrevealing autoimmune workup: Negative HL a B27, inflammatory markers and ROBERTO; maintained on Celebrex and last saw Ms. " Lori TOMAS 10/10/2023 with the following evaluation:            She has trialed Robaxin per Rheumatology with noticeable drowsiness and ineffective pain relief.    Patient denies night fever/night sweats, urinary incontinence, bowel incontinence, significant weight loss, significant motor weakness, and loss of sensations.    Pain Disability Index Review:         9/17/2024    12:32 PM   Last 3 PDI Scores   Pain Disability Index (PDI) 35       Non-Pharmacologic Treatments:  Physical Therapy/Home Exercise: yes  Ice/Heat:yes  TENS: no  Acupuncture: no  Massage: yes  Chiropractic: yes    Other: no      Pain Medications:  - Adjuvant Medications: Robaxin ( Methocarbamol), trazodone, paroxetine, oxcarbazepine, Celebrex,Ubrelvy    Pain Procedures:   None    Past Medical History:   Diagnosis Date    Abnormal Pap smear of cervix     Bipolar disorder     Generalized anxiety disorder     Hypophosphatasia     Obsessive-compulsive disorder      Past Surgical History:   Procedure Laterality Date    CERVICAL BIOPSY  W/ LOOP ELECTRODE EXCISION      lipo on chin       Review of patient's allergies indicates:   Allergen Reactions    Latex Itching, Hives, Rash and Other (See Comments)     By contact only       Current Outpatient Medications   Medication Sig    asfotase mariela (STRENSIQ) 40 mg/mL Soln Inject 1 mg/kg into the skin.    asfotase mariela (STRENSIQ) 80 mg/0.8 mL Soln Inject 80 mg into the skin.    celecoxib (CELEBREX) 200 MG capsule Take 1 capsule (200 mg total) by mouth once daily.    ciprofloxacin HCl (CIPRO) 500 MG tablet Take 500 mg by mouth 2 (two) times daily.    DIFLUCAN 150 mg Tab Take 1 tablet by oral route for 1 day.    erenumab-aooe (AIMOVIG AUTOINJECTOR) 70 mg/mL autoinjector Inject 70 mg into the skin.    erenumab-aooe (AIMOVIG AUTOINJECTOR) 70 mg/mL autoinjector Inject 70 mg into the skin.    fluocinonide (LIDEX) 0.05 % external solution AAA scalp qday - bid prn pruritus    fluticasone propionate (FLONASE) 50  mcg/actuation nasal spray 1 spray.    ketoconazole (NIZORAL) 2 % shampoo Apply topically once a week. Lather in for 5-10 min before rinsing    ketoconazole (NIZORAL) 2 % shampoo Apply topically.    lubiprostone (AMITIZA) 24 MCG Cap Take 1 capsule (24 mcg total) by mouth 2 (two) times daily with meals.    methocarbamoL (ROBAXIN) 750 MG Tab Take 1 tablet (750 mg total) by mouth 3 (three) times daily as needed (pain).    methocarbamoL (ROBAXIN) 750 MG Tab Take 750 mg by mouth.    methylPREDNISolone (MEDROL DOSEPACK) 4 mg tablet FOLLOW PACKAGE DIRECTIONS    norgestimate-ethinyl estradioL (ORTHO TRI-CYCLEN LO) 0.18/0.215/0.25 mg-25 mcg tablet Take 1 tablet by mouth.    onabotulinumtoxina (BOTOX) 200 unit SolR Inject into the muscle.    OXcarbazepine (OXTELLAR XR) 600 mg Tb24 Take 600 mg by mouth once daily.    paroxetine (PAXIL) 20 MG tablet Take 20 mg by mouth once daily.    paroxetine (PAXIL) 20 MG tablet Take 1 tablet by mouth once daily.    STRENSIQ 40 mg/mL Soln Inject 120 mg daily on M-W-F    STRENSIQ 80 mg/0.8 mL Soln Inject 120 mg daily on M-W-F    traZODone (DESYREL) 100 MG tablet Take 100 mg by mouth every evening.    traZODone (DESYREL) 100 MG tablet Take 1 tablet by mouth every evening.    tretinoin (RETIN-A) 0.05 % cream Apply topically every evening.    TRI-SPRINTEC, 28, 0.18/0.215/0.25 mg-35 mcg (28) tablet Take 1 tablet by mouth.    ubrogepant (UBRELVY) 100 mg tablet Take by mouth.    ubrogepant (UBRELVY) 100 mg tablet Take 1 tablet by mouth daily as needed.    spironolactone (ALDACTONE) 50 MG tablet Take 1 tablet (50 mg total) by mouth once daily.     No current facility-administered medications for this visit.         ROS:  GENERAL:  No weight loss, malaise or fevers.  HEENT:   No recent changes in vision or hearing  NECK:  Negative for lumps, no difficulty with swallowing.  RESPIRATORY:  Negative for cough, wheezing or shortness of breath, patient denies any recent URI.  CARDIOVASCULAR:  Negative for  "chest pain or palpitations.  GI:  Negative for abdominal discomfort, blood in stools or black stools or change in bowel habits.  MUSCULOSKELETAL:  See HPI.  SKIN:  Negative for lesions, rash, and itching.  PSYCH:  No mood disorder or recent psychosocial stressors.   HEMATOLOGY/LYMPHOLOGY:  Negative for prolonged bleeding, bruising easily or swollen nodes.    NEURO:   No history of syncope, paralysis, seizures or tremors.  All other reviewed and negative other than HPI.    OBJECTIVE:    /64 (BP Location: Left arm, Patient Position: Sitting, BP Method: Medium (Automatic))   Pulse 90   Resp 17   Ht 5' 5" (1.651 m)   Wt 63.5 kg (140 lb)   BMI 23.30 kg/m²       Physical Exam:    GENERAL: Well appearing, in no acute distress, alert and oriented x3.  PSYCH:  Mood and affect appropriate.  SKIN: Skin color, texture, turgor normal, no rashes or lesions.  HEAD/FACE:  Normocephalic, atraumatic. Cranial nerves grossly intact.    NECK:  pain to palpation over the cervical paraspinous muscles. Spurling Negative. pain with neck flexion, extension, or lateral flexion.   Normaltesting biceps, triceps and brachioradialis bilaterally.    NegativeHoffmann's bilaterally.    5/5 strength testing deltoid, biceps, triceps, wrist extensor, wrist flexor and ulnar intrinsics bilaterally.    Normal  strength bilaterally    CV: RRR with palpation of the radial artery.  PULM: No evidence of respiratory difficulty, symmetric chest rise.  GI:  Soft and non-tender.    BACK: Straight leg raising in the sitting and supine positions is negative to radicular pain. No pain to palpation over the facet joints of the lumbar spine or spinous processes. Normal range of motion without pain reproduction.  EXTREMITIES: Peripheral joint ROM is full and pain free without obvious instability or laxity in all four extremities. No deformities, edema, or skin discoloration. Good capillary refill.  MUSCULOSKELETAL: Able to stand on heels & toes. "   Shoulder, hip, and knee provocative maneuvers are negative.  There is no pain with palpation over the sacroiliac joints bilaterally.  Gaenslen's, Distraction/Compression and  FABERs test is positive.  Facet loading test is negative bilaterally.   Bilateral upper and lower extremity strength is normal and symmetric.  No atrophy or tone abnormalities are noted.    RIGHT Lower extremity: Hip flexion 5/5, Hip Abduction 5/5, Hip Adduction 5/5, Knee extension 5/5, Knee flexion 5/5, Ankle dorsiflexion5/5, Extensor hallucis longus 5/5, Ankle plantarflexion 5/5  LEFT Lower extremity:  Hip flexion 5/5, Hip Abduction 5/5,Hip Adduction 5/5, Knee extension 5/5, Knee flexion 5/5, Ankle dorsiflexion 5/5, Extensor hallucis longus 5/5, Ankle plantarflexion 5/5  -Normal testing knee (patellar) jerk and ankle (achilles) jerk    NEURO: Bilateral upper and lower extremity coordination and muscle stretch reflexes are physiologic and symmetric. No loss of sensation is noted.  GAIT: normal.    Imaging:   MRI sacroiliac joints 11/10/2023  FINDINGS: The sacroiliac joint spaces are well-preserved and normal in appearance without erosions, bony ankylosis, abnormal subchondral marrow signal, abnormal enhancement or other evidence of sacroiliitis. No fracture or abnormal marrow signal. The visualized lower lumbar spine is normal. The muscles and remaining soft tissues are normal.     X-ray sacroiliac joints 04/26/2023  FINDINGS: Sacroiliac joints are normal. No spurring or sclerosis. Osseous structures intact.     08/11/23    X-Ray Cervical Spine AP And Lateral    Narrative  EXAMINATION:  XR CERVICAL SPINE AP LATERAL    CLINICAL HISTORY:  Other disorders of phosphorus metabolism    TECHNIQUE:  AP, lateral, and open mouth views of the cervical spine were performed.    COMPARISON:  None    FINDINGS:  There is some straightening of the normal cervical lordosis which can be associated with muscle spasm.  Vertebral body heights are within normal  limits.  No definite spondylolisthesis demonstrated.  Intervertebral disk spaces are well maintained.  Posterior elements appear intact without acute fractures or subluxations demonstrated.  Odontoid process appears intact.  Atlantoaxial articulations appear normal.  Prevertebral soft tissues are within normal limits.        ASSESSMENT: 29 y.o. year old female with     1. Myofascial pain syndrome of lumbar spine  X-Ray Lumbar Complete Including Flex And Ext    HME - OTHER    Ambulatory referral/consult to Physical/Occupational Therapy      2. Hypophosphatasia  Ambulatory referral/consult to Physical/Occupational Therapy      3. Cervical myofascial pain syndrome  Ambulatory referral/consult to Physical/Occupational Therapy            PLAN:   - Interventions:  We have discussed considering future cervical, thoracic or lumbar myofascial trigger point injections in clinic to address musculoskeletal pain. Explained the risks and benefits of the procedure in detail with the patient today in clinic along with alternative treatment options.    - Anticoagulation use: No no anticoagulation     report:  Reviewed and consistent with medication use as prescribed.    - Medications:  -DC Robaxin 2/2 inefficacy    -We have discussed temporarily starting an anti spasmodic, tizanidine 4 mg up to BID to see if this helps with myofascial pain.  We have discussed potential deleterious side effects associated with this medication including  dizziness, drowsiness, dry mouth or tingling sensation in the upper or lower extremities.       - Therapy:   We discussed initiating physical therapy to help manage the patient/s painful condition. The patient was counseled that muscle strengthening will improve the long term prognosis in regards to pain and may also help increase range of motion and mobility. They were told that one of the goals of physical therapy is that they learn how to do the exercises so that they can do them independently  at home daily upon completion. The patient's questions were answered and they were agreeable to this course. [facility] was provided to the patient.    -A TENS unit was provided to the patient and they were instructed on its use by the [employer] ([employer]) representatives. The patient was counseled that this may help treat their myofascial pain through transcutaneous electrical nerve stimulation and excitation via an electric current.  We have discussed that the unit is usually connected to the skin using two or more electrodes, which are typically conductive gel pads.  A typical battery-operated TENS unit is able to modulate pulse width, frequency and intensity to help reduce pain.      - Imaging: Reviewed available imaging (MRI sacroiliac joints, x-ray sacroiliac joints, x-ray cervical spine) with patient and answered any questions they had regarding study.  X-ray lumbar spine to better evaluate pain    - Consults/Referrals:(PRN)  -Rheumatology: Ms. Sandoval PAC  -Endocrinology: Dr. Sr    - Follow up visit: return to clinic in 6-8 weeks.  Virtual visit.  Nan Anderson PA-C      The above plan and management options were discussed at length with patient. Patient is in agreement with the above and verbalized understanding.    - I discussed the goals of interventional chronic pain management with the patient on today's visit. We discussed a multimodal and systematic approach to pain.  This includes diagnostic and therapeutic injections, adjuvant pharmacologic treatment, physical therapy, and at times psychiatry.  I emphasized the importance of regular exercise, core strengthening and stretching, diet and weight loss as a cornerstone of long-term pain management.    - This condition does not require this patient to take time off of work, and the primary goal of our Pain Management services is to improve the patient's functional capacity.  - Patient Questions: Answered all of  the patient's questions regarding diagnoses, therapy, treatment and next steps    Visit today included increased complexity associated with the care of the episodic problem of chronic pain which was addressed and continue to manage the longitudinal care of the patient due to the serious and/or complex managed problem(s) listed above.      Swapna Martin MD  Interventional Pain Management  [facility]    Disclaimer:  This note was prepared using voice recognition system and is likely to have sound alike errors that may have been overlooked even after proof reading.  Please call me with any questions

## 2024-09-17 ENCOUNTER — OFFICE VISIT (OUTPATIENT)
Dept: PAIN MEDICINE | Facility: CLINIC | Age: 29
End: 2024-09-17
Payer: COMMERCIAL

## 2024-09-17 ENCOUNTER — HOSPITAL ENCOUNTER (OUTPATIENT)
Dept: RADIOLOGY | Facility: HOSPITAL | Age: 29
Discharge: HOME | End: 2024-09-17
Attending: ANESTHESIOLOGY
Payer: COMMERCIAL

## 2024-09-17 VITALS
HEIGHT: 65 IN | HEART RATE: 90 BPM | SYSTOLIC BLOOD PRESSURE: 104 MMHG | RESPIRATION RATE: 17 BRPM | BODY MASS INDEX: 23.32 KG/M2 | WEIGHT: 140 LBS | DIASTOLIC BLOOD PRESSURE: 64 MMHG

## 2024-09-17 DIAGNOSIS — E83.39 HYPOPHOSPHATASIA: ICD-10-CM

## 2024-09-17 DIAGNOSIS — M79.18 MYOFASCIAL PAIN SYNDROME OF LUMBAR SPINE: Primary | ICD-10-CM

## 2024-09-17 DIAGNOSIS — M79.18 MYOFASCIAL PAIN SYNDROME OF LUMBAR SPINE: ICD-10-CM

## 2024-09-17 DIAGNOSIS — M79.18 CERVICAL MYOFASCIAL PAIN SYNDROME: ICD-10-CM

## 2024-09-17 PROCEDURE — 99999 PR PBB SHADOW E&M-EST. PATIENT-LVL III: CPT | Mod: PBBFAC,,, | Performed by: ANESTHESIOLOGY

## 2024-09-17 PROCEDURE — 1159F MED LIST DOCD IN RCRD: CPT | Mod: CPTII,S$GLB,, | Performed by: ANESTHESIOLOGY

## 2024-09-17 PROCEDURE — 3008F BODY MASS INDEX DOCD: CPT | Mod: CPTII,S$GLB,, | Performed by: ANESTHESIOLOGY

## 2024-09-17 PROCEDURE — 3078F DIAST BP <80 MM HG: CPT | Mod: CPTII,S$GLB,, | Performed by: ANESTHESIOLOGY

## 2024-09-17 PROCEDURE — G2211 COMPLEX E/M VISIT ADD ON: HCPCS | Mod: S$GLB,,, | Performed by: ANESTHESIOLOGY

## 2024-09-17 PROCEDURE — 99204 OFFICE O/P NEW MOD 45 MIN: CPT | Mod: S$GLB,,, | Performed by: ANESTHESIOLOGY

## 2024-09-17 PROCEDURE — 72114 X-RAY EXAM L-S SPINE BENDING: CPT | Mod: TC

## 2024-09-17 PROCEDURE — 3074F SYST BP LT 130 MM HG: CPT | Mod: CPTII,S$GLB,, | Performed by: ANESTHESIOLOGY

## 2024-09-17 PROCEDURE — 1160F RVW MEDS BY RX/DR IN RCRD: CPT | Mod: CPTII,S$GLB,, | Performed by: ANESTHESIOLOGY

## 2024-09-17 PROCEDURE — 72114 X-RAY EXAM L-S SPINE BENDING: CPT | Mod: 26,,, | Performed by: RADIOLOGY

## 2024-09-17 RX ORDER — ASFOTASE ALFA 80 MG/.8ML
80 SOLUTION SUBCUTANEOUS
COMMUNITY
Start: 2023-11-10

## 2024-09-17 RX ORDER — ASFOTASE ALFA 40 MG/ML
1 SOLUTION SUBCUTANEOUS
COMMUNITY
Start: 2023-11-10

## 2024-09-17 RX ORDER — ERENUMAB-AOOE 70 MG/ML
70 INJECTION SUBCUTANEOUS
COMMUNITY
Start: 2024-07-29

## 2024-09-17 RX ORDER — METHYLPREDNISOLONE 4 MG/1
TABLET ORAL
COMMUNITY
Start: 2024-07-31

## 2024-09-17 RX ORDER — TRAZODONE HYDROCHLORIDE 100 MG/1
1 TABLET ORAL NIGHTLY
COMMUNITY
Start: 2024-07-31 | End: 2025-01-27

## 2024-09-17 RX ORDER — FLUCONAZOLE 150 MG
TABLET ORAL
COMMUNITY
Start: 2024-03-12

## 2024-09-17 RX ORDER — KETOCONAZOLE 20 MG/ML
SHAMPOO, SUSPENSION TOPICAL
COMMUNITY
Start: 2024-08-01 | End: 2025-08-01

## 2024-09-17 RX ORDER — UBROGEPANT 100 MG/1
1 TABLET ORAL DAILY PRN
COMMUNITY

## 2024-09-17 RX ORDER — FLUTICASONE PROPIONATE 50 MCG
1 SPRAY, SUSPENSION (ML) NASAL
COMMUNITY
Start: 2024-07-31 | End: 2025-07-31

## 2024-09-17 RX ORDER — PAROXETINE HYDROCHLORIDE 20 MG/1
1 TABLET, FILM COATED ORAL DAILY
COMMUNITY

## 2024-09-17 RX ORDER — CIPROFLOXACIN 500 MG/1
500 TABLET ORAL 2 TIMES DAILY
COMMUNITY
Start: 2024-09-12

## 2024-09-17 RX ORDER — NORGESTIMATE AND ETHINYL ESTRADIOL 7DAYSX3 LO
1 KIT ORAL
COMMUNITY

## 2024-09-17 RX ORDER — METHOCARBAMOL 750 MG/1
750 TABLET, FILM COATED ORAL
COMMUNITY
Start: 2024-07-31 | End: 2025-01-27

## 2024-09-19 NOTE — PROGRESS NOTES
"[facility]   Physical Therapy Initial Evaluation     Date: 9/23/2024   Name: Megan Estrada  Clinic Number: [id number]    Therapy Diagnosis: No diagnosis found.  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.39 (ICD-10-CM) - Hypophosphatasia   M79.18 (ICD-10-CM) - Cervical myofascial pain syndrome   Evaluation Date: 9/23/2024  Authorization Period Expiration: 12/31/24  Plan of Care Expiration: ***  Progress Note Due: Every 6th visit  Visit # / Visits authorized: 1/1   FOTO: ***/3    Precautions: {IP WOUND PRECAUTIONS OHS:94738}     Time In: ***  Time Out: ***  Total Appointment Time (timed & untimed codes): *** minutes      SUBJECTIVE     Date of onset: ***    History of current condition - Megan reports: ***    Falls: ***    Imaging: X-ray of lumbar spine, 9/17/24, "The vertebral bodies demonstrate a normal height and alignment.  The disc space heights are well maintained.  No pars defects are identified."    Prior Therapy: ***  Social History: *** {LIVES WITH:[address]321}  Occupation: ***  Prior Level of Function: ***  Current Level of Function: ***    Pain:  Current {0-10:87753::"0"}/10, worst {0-10:08964::"0"}/10, best {0-10:59905::"0"}/10   Location: {RIGHT LEFT BILATERAL:76422} {LOCATION ON BODY:09298} {Pain Loc:75741}  Description: {Pain Description:70220}  Aggravating Factors: {Causes; Pain:64953}  Easing Factors: {Pain (activities that relieve):33676}    Patients goals: ***     Medical History:   Past Medical History:   Diagnosis Date    Abnormal Pap smear of cervix     Bipolar disorder     Generalized anxiety disorder     Hypophosphatasia     Obsessive-compulsive disorder        Surgical History:   Megan Estrada  has a past surgical history that includes lipo on chin and Cervical biopsy w/ loop electrode excision.    Medications:   Megan has a current medication " list which includes the following prescription(s): strensiq, strensiq, celecoxib, ciprofloxacin hcl, diflucan, aimovig autoinjector, aimovig autoinjector, fluocinonide, fluticasone propionate, ketoconazole, ketoconazole, lubiprostone, methocarbamol, methocarbamol, methylprednisolone, norgestimate-ethinyl estradiol, onabotulinumtoxina, oxcarbazepine, paroxetine, paroxetine, spironolactone, strensiq, strensiq, trazodone, trazodone, tretinoin, tri-sprintec (28), ubrelvy, and ubrelvy.    Allergies:   Review of patient's allergies indicates:   Allergen Reactions    Latex Itching, Hives, Rash and Other (See Comments)     By contact only          OBJECTIVE   (NT = not tested due to pain and/or inability to obtain test position)    RANGE OF MOTION:    Lumbar   Range of Motion Right  (spine) Left Function   & Pain Goal   Lumbar Flexion (60º)   []Functional  []Dysfunctional  []Painful  []Non-Painful 50º  Functional   Nonpainful   Lumbar Extension (30º)   []Functional  []Dysfunctional  []Painful  []Non-Painful 20º  Functional   Nonpainful   Lumbar Side Bending (25º)   []Functional  []Dysfunctional  []Painful  []Non-Painful 20º  Functional   Nonpainful   Lumbar Rotation   []Functional  []Dysfunctional  []Painful  []Non-Painful 45º  Functional   Nonpainful       Hip   Range of Motion Right   Left   Goal   Hip Flexion (120º)   120º   Hip Abduction (45º)   30º   Hip Extension (20º)    15º   Hip internal rotaiton  (45º)   40º   Hip external rotation  (45º)   40º    (*) pain and/or dysfunction      STRENGTH:    Lower Extremity  Strength RIGHT   Goal   Hip Flexion  []1  []2  []3  []4  []5                []+ []- 5/5 B    Hip Extension  []1  []2  []3  []4  []5                []+ []- 5/5 B   Hip Abduction  []1  []2  []3  []4  []5                []+ []- 5/5 B   Hip Internal rotaiton  []1  []2  []3  []4  []5                []+ []- 5/5 B   Hip External rotation  []1  []2  []3  []4  []5                []+ []- 5/5 B   Knee Flexion []1  []2   []3  []4  []5                []+ []- 5/5 B   Knee Extension []1  []2  []3  []4  []5                []+ []- 5/5 B   Ankle Dorsiflexion []1  []2  []3  []4  []5                []+ []- 5/5 B   Ankle Plantarflexion []1  []2  []3  []4  []5                []+ []- 5/5 B   Ankle Inversion []1  []2  []3  []4  []5                []+ []- 5/5 B   Ankle Eversion []1  []2  []3  []4  []5                []+ []- 5/5 B   Big Toe Extension []1  []2  []3  []4  []5                []+ []- 5/5 B       Lower Extremity  Strength LEFT   Goal   Hip Flexion  []1  []2  []3  []4  []5                []+ []- 5/5 B    Hip Extension  []1  []2  []3  []4  []5                []+ []- 5/5 B   Hip Abduction  []1  []2  []3  []4  []5                []+ []- 5/5 B   Hip Internal rotaiton  []1  []2  []3  []4  []5                []+ []- 5/5 B   Hip External rotation  []1  []2  []3  []4  []5                []+ []- 5/5 B   Knee Flexion []1  []2  []3  []4  []5                []+ []- 5/5 B   Knee Extension []1  []2  []3  []4  []5                []+ []- 5/5 B   Ankle Dorsiflexion []1  []2  []3  []4  []5                []+ []- 5/5 B   Ankle Plantarflexion []1  []2  []3  []4  []5                []+ []- 5/5 B   Ankle Inversion []1  []2  []3  []4  []5                []+ []- 5/5 B   Ankle Eversion []1  []2  []3  []4  []5                []+ []- 5/5 B   Big Toe Extension []1  []2  []3  []4  []5                []+ []- 5/5 B     MUSCLE LENGTH:     Muscle Length  Lower Extremity Right   Limitation Left    Limitation Goal   Hip Flexors    - Guevara Test position [] Normal  [] Limited   inches [] Normal  [] Limited   inches Normal     Quadriceps  - prone heel to glute [] Normal  [] Limited   inches [] Normal  [] Limited   inches Normal    Hamstrings  - 90/90 Test [] Normal  [] Limited   degrees [] Normal  [] Limited   degrees Normal   Piriformis   - BIGG Test [] Normal  [] Limited   inches [] Normal  [] Limited   inches Normal   Gastrocnemius   - Forefoot to neutral []  Normal  [] Limited    [] Normal  [] Limited  Normal   Soleus   - Knee to Wall [] Normal  [] Limited  [] Normal  [] Limited  Normal     JOINT MOBILITY:     Joint Motion Tested Right  (spine) Left    Goal    {JOINT MOBILITY:68202} {JOINT MOBILITY:62878} Normal B    {JOINT MOBILITY:03591} {JOINT MOBILITY:63643} Normal B     SPECIAL TESTS:    TEST Right  (spine)   Left    Goal    {POSITIVE/NEGATIVE:88054} {POSITIVE/NEGATIVE:00287} Negative B     {POSITIVE/NEGATIVE:19862} {POSITIVE/NEGATIVE:10855} Negative B     {POSITIVE/NEGATIVE:01955} {POSITIVE/NEGATIVE:04527} Negative B        Sensation:  [] Intact to Light Touch   [] Impaired:    Reflex Integrity: ***    Palpation: Increased tone and tenderness noted with palpation to: ***    Posture:  Pt presents with postural abnormalities which include: forward head and rounded shoulders    Gait Analysis: The patient ambulated with the following assistive device: none; the pt presents with the following gait abnormalities: decreased ***; increased***    Movement Analysis:    Functional Test  Outcome   Double Leg Squat    Single Leg Step Down    Walking [facility]      Endurance/Fall Tests  Outcome Norms GOAL   Timed Up and Go  <13.5 sec    Five Time Sit to Stand  60s: <11.4 sec  70s: <12.6 sec  80s: 14.8 sec    6 minutes walk  60s: >538 f , 572 m  70s: >471 f, 527 m  80s: >417 f, 392 m  **above in meters      Balance:   Balance Testing RIGHT   LEFT   Goal   Closed *** --- 60 seconds   Tandem   20 seconds   Single Leg   20 seconds       FUNCTION:     Intake Outcome Measure for FOTO *** Survey    Therapist reviewed FOTO scores for Megan Estrada on 9/23/2024.   FOTO documents entered into EPIC - see Media section.    Intake Score: ***%           TREATMENT     Total Treatment time (time-based codes) separate from Evaluation: *** minutes      Megan received the treatments listed below:      therapeutic exercises to develop strength, endurance, ROM, and flexibility for *** minutes  "including:  ***    manual therapy techniques: Joint mobilizations, Manual traction, and Soft tissue Mobilization were applied to the: *** for *** minutes, including:  ***    neuromuscular re-education activities to improve: Balance, Coordination, Kinesthetic, Sense, Proprioception, and Posture for *** minutes. The following activities were included:  ***    therapeutic activities to improve functional performance for ***  minutes, including:  ***    gait training to improve functional mobility and safety for ***  minutes, including:  ***    PATIENT EDUCATION AND HOME EXERCISES     Education provided:   Patient educated on the impairments noted above and the effects of physical therapy intervention to improve overall condition and QOL.   Patient was educated on all the above exercise prior/during/after for proper posture, positioning, and execution for safe performance with home exercise program.   Patient educated on proper ergonomics at the work station in order to maintain optimal alignment of the musculoskeletal system and improve efficiency in the work environment.  Patient educated on the importance of strong core and lower extremity musculature in order to improve both static and dynamic balance, improve gait mechanics, reduce fall risk and improve household and community mobility.      Written Home Exercises Provided: {Blank single:70486::"yes","Patient instructed to cont prior HEP"}. Exercises were reviewed and Megan was able to demonstrate them prior to the end of the session.  Megan demonstrated {Desc; good/fair/poor:12866} understanding of the education provided. See EMR under Patient Instructions for exercises provided during therapy sessions.    ASSESSMENT     Megan is a 29 y.o. female referred to outpatient Physical Therapy with a medical diagnosis of ***. Patient presents with ***    Patient prognosis is {REHAB PROGNOSIS OHS:13982}.   Patient will benefit from skilled outpatient Physical Therapy to " address the deficits stated above and in the chart below, provide patient /family education, and to maximize patientt's level of independence.     Plan of care discussed with patient: {YES:34484}  Patient's spiritual, cultural and educational needs considered and patient is agreeable to the plan of care and goals as stated below:     Anticipated Barriers for therapy: ***    Medical Necessity is demonstrated by the following  History  Co-morbidities and personal factors that may impact the plan of care [] LOW: no personal factors / co-morbidities  [] MODERATE: 1-2 personal factors / co-morbidities  [] HIGH: 3+ personal factors / co-morbidities    Moderate / High Support Documentation:   Co-morbidities affecting plan of care: ***    Personal Factors:   {Personal Factors:40316}     Examination  Body Structures and Functions, activity limitations and participation restrictions that may impact the plan of care [] LOW: addressing 1-2 elements  [] MODERATE: 3+ elements  [] HIGH: 4+ elements (please support below)    Moderate / High Support Documentation: ***     Clinical Presentation [] LOW: stable  [] MODERATE: Evolving  [] HIGH: Unstable     Decision Making/ Complexity Score: {Desc; low/moderate/high:603499}       GOALS:  SHORT TERM GOALS: 4 weeks, (***) Progress   Recent signs and systems trend is improving in order to progress towards Long term goals's.    Patient will be independent with Home Exercise Program  in order to further progress and return to maximal function.    Pain rating at Worst: 5/10 in order to progress towards increased independence with activity.    Patient will be able to correct postural deviations in sitting and standing, to decrease pain and promote postural awareness for injury prevention.     Patient will partially meet predicted functional outcome (FOTO) score: ***% to improve towards increasing self-worth & perceived functional ability.      LONG TERM GOALS: 8 weeks, (***) Progress   Patient  "will return to normal Activities of daily living, recreational, and work related activities with less pain and limitation.     Patient will improve Range of Motion to stated goals in order to return to maximal functional potential.     Patient will improve Strength to stated goals of appropriate musculature in order to improve functional independence.     Pain Rating at Best: 1/10 to improve Quality of Life.     Patient will meet predicted functional outcome (FOTO) score: ***% to increase self-worth & perceived functional ability.    Patient will have met/partially met personal goal of: ***         PLAN   Plan of care Certification: 9/23/2024 to ***.    Outpatient Physical Therapy {NUMBERS 1-5:80505} times weekly for {0-10:36479::"0"} weeks to include the following interventions: Cervical/Lumbar Traction, Electrical Stimulation , Gait Training, Manual Therapy, Moist Heat/ Ice, Neuromuscular Re-ed, Orthotic Management and Training, Patient Education, Self Care, Therapeutic Activities, Therapeutic Exercise, and FDN    Darlene Rinaldi, PT      I CERTIFY THE NEED FOR THESE SERVICES FURNISHED UNDER THIS PLAN OF TREATMENT AND WHILE UNDER MY CARE   Physician's comments:     Physician's Signature: ___________________________________________________    "

## 2024-09-23 ENCOUNTER — CLINICAL SUPPORT (OUTPATIENT)
Dept: REHABILITATION | Facility: HOSPITAL | Age: 29
End: 2024-09-23
Attending: ANESTHESIOLOGY
Payer: COMMERCIAL

## 2024-09-23 DIAGNOSIS — M79.18 CERVICAL MYOFASCIAL PAIN SYNDROME: ICD-10-CM

## 2024-09-23 DIAGNOSIS — M25.512 ACUTE PAIN OF LEFT SHOULDER: Primary | ICD-10-CM

## 2024-09-23 DIAGNOSIS — E83.39 HYPOPHOSPHATASIA: ICD-10-CM

## 2024-09-23 DIAGNOSIS — R53.1 WEAKNESS: ICD-10-CM

## 2024-09-23 DIAGNOSIS — M79.18 MYOFASCIAL PAIN SYNDROME OF LUMBAR SPINE: ICD-10-CM

## 2024-09-23 PROCEDURE — 97161 PT EVAL LOW COMPLEX 20 MIN: CPT | Mod: PN

## 2024-09-23 PROCEDURE — 97140 MANUAL THERAPY 1/> REGIONS: CPT | Mod: PN

## 2024-09-23 PROCEDURE — 97110 THERAPEUTIC EXERCISES: CPT | Mod: PN
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
## 2024-10-16 ENCOUNTER — TELEPHONE (OUTPATIENT)
Dept: RHEUMATOLOGY | Facility: CLINIC | Age: 29
End: 2024-10-16
Payer: COMMERCIAL

## 2024-10-16 NOTE — TELEPHONE ENCOUNTER
Patient notified that her appointment with Dr. Graves on 10-29 was cancelled because he's no longer her. The dr are booked until after the 1st of the year. I let her know she can check with [facility] or [facility] they both have rheumatologist. Check with your insurance. Someone from here will call you to reschedule. She said ok. I had called here mother earlier because when I called her daughter at first I got a message saying her phone had been disconnected. I had left the [phone] off when I first called her. I called her mom back to let her know I had   Gotten in touch with her daughter. I let her know I left the [phone] off the front of her number when I called her the first. She said oh good.

## 2024-10-22 ENCOUNTER — TELEPHONE (OUTPATIENT)
Dept: ENDOCRINOLOGY | Facility: CLINIC | Age: 29
End: 2024-10-22
Payer: COMMERCIAL

## 2024-10-22 DIAGNOSIS — E83.39 HYPOPHOSPHATASIA: Primary | ICD-10-CM

## 2024-10-22 DIAGNOSIS — E21.3 HYPERPARATHYROIDISM: ICD-10-CM

## 2024-10-22 RX ORDER — ASFOTASE ALFA 40 MG/ML
SOLUTION SUBCUTANEOUS
Qty: 30 ML | Refills: 3 | Status: SHIPPED | OUTPATIENT
Start: 2024-10-22

## 2024-10-22 RX ORDER — ASFOTASE ALFA 40 MG/ML
SOLUTION SUBCUTANEOUS
Qty: 12 ML | Refills: 10 | Status: SHIPPED | OUTPATIENT
Start: 2024-10-22

## 2024-10-22 RX ORDER — ASFOTASE ALFA 80 MG/.8ML
SOLUTION SUBCUTANEOUS
Qty: 12 ML | Refills: 10 | Status: SHIPPED | OUTPATIENT
Start: 2024-10-22

## 2024-10-22 NOTE — TELEPHONE ENCOUNTER
Sent the 40 mg just now.      ----- Message from Med Assistant Escobedo sent at 10/22/2024  2:12 PM CDT -----  Regarding: FW: Consult/Advisory  Contact: [phone]    ----- Message -----  From: Yanely Fatima  Sent: 10/22/2024   1:11 PM CDT  To: [facility] Staff  Subject: Consult/Advisory                                 Consult/Advisory     Name Of Caller: Samir with [facility]          Contact Preference:      [facility] (Preferred) - [place] - [address] [address] [address]  [place] [address]  Phone: [phone] Fax: [phone]     Nature of call: Samir calling to advise that they did not receive STRENSIQ 40 mg/0.8 mL Soln she is on the 80mg and 40mg.

## 2024-11-11 ENCOUNTER — OFFICE VISIT (OUTPATIENT)
Dept: PAIN MEDICINE | Facility: CLINIC | Age: 29
End: 2024-11-11
Payer: COMMERCIAL

## 2024-11-11 DIAGNOSIS — M79.18 MYOFASCIAL PAIN SYNDROME OF LUMBAR SPINE: Primary | ICD-10-CM

## 2024-11-11 DIAGNOSIS — E83.39 HYPOPHOSPHATASIA: ICD-10-CM

## 2024-11-11 DIAGNOSIS — M79.18 CERVICAL MYOFASCIAL PAIN SYNDROME: ICD-10-CM

## 2024-11-11 PROCEDURE — 1159F MED LIST DOCD IN RCRD: CPT | Mod: CPTII,95,, | Performed by: PHYSICIAN ASSISTANT

## 2024-11-11 PROCEDURE — 1160F RVW MEDS BY RX/DR IN RCRD: CPT | Mod: CPTII,95,, | Performed by: PHYSICIAN ASSISTANT

## 2024-11-11 PROCEDURE — 99214 OFFICE O/P EST MOD 30 MIN: CPT | Mod: 95,,, | Performed by: PHYSICIAN ASSISTANT

## 2024-11-11 NOTE — PROGRESS NOTES
Established Patient - TeleHealth Visit    The patient location is: [place]  The chief complaint leading to consultation is: chronic pain     Visit type: audiovisual    Face to Face time with patient: 10-15 minutes  20 minutes of total time spent on the encounter, which includes face to face time and non-face to face time preparing to see the patient (eg, review of tests), Obtaining and/or reviewing separately obtained history, Documenting clinical information in the electronic or other health record, Independently interpreting results (not separately reported) and communicating results to the patient/family/caregiver, or Care coordination (not separately reported).     Each patient to whom he or she provides medical services by telemedicine is:  (1) informed of the relationship between the physician and patient and the respective role of any other health care provider with respect to management of the patient; and (2) notified that he or she may decline to receive medical services by telemedicine and may withdraw from such care at any time.      Chronic Pain -- Established Patient (Follow-up visit)    Referring Physician: self     PCP: Sabrina Wagner MD    Chief complaint:  Follow-up     Neck pain with no radicular pain - only into shoulder area, myofascial in nature  Low back pain with no radicular pain      SUBJECTIVE:    Interval History (11/11/2024):  Patient presents today for follow-up visit.  Patient was last seen on 9/17/2024. At that visit, the plan was to start physical therapy, but she has not been able to attend yet. Patient reports pain as 4/10 today.  She has been using the 10s unit, along with muscle relaxer as needed.    Initial HPI (9/17/2024):  Megan Estrada is a 29 y.o. female with past medical history significant for migraine headaches, anxiety/bipolar disorder, obsessive-compulsive disorder, hypophosphatasia who presents to the clinic for the evaluation of diffuse pain in the neck, between her  "shoulders, lower back.    Of note patient follows with endocrinology Dr. Sr four hypophosphatemia with diffuse pain, most recently seen 06/27/2024 with the following evaluation:    Today her primary concern is diffuse "muscle spasms everywhere." Patient has longstanding history of hypophosphatemia with muscle pain, stiffness and painful bones," exceeding 4 years in duration.  Today she reports pain may be more severe in the neck in bilateral cervical paraspinous musculature, midthoracic territory as well as acute pain in her shoulder without precipitating accident or injury.  Pain in the neck can be exacerbated with cervical lateral rotation.  She denies significant upper extremity weakness today or compromise in hand  strength or dexterity.  Patient does use medicinal marijuana which she believes helps with her pain but uncertain medications can amplify her pain where she was able to feel pain in numerous areas throughout her body.Patient has received no prior trigger point or interventional treatment under fluoroscopy for neck, mid or lower back or joint pain.  Patient is currently not maintained on any membrane stabilizing agents.    Chart review reveals history of multiple prior fractures including ring finger, wrist, foot and left ankle.   Of note patient follows with Rheumatology, with unrevealing autoimmune workup: Negative HL a B27, inflammatory markers and ROBERTO; maintained on Celebrex and last saw Ms. Lori GENOVEVA 10/10/2023 with the following evaluation:            She has trialed Robaxin per Rheumatology with noticeable drowsiness and ineffective pain relief.    Patient denies night fever/night sweats, urinary incontinence, bowel incontinence, significant weight loss, significant motor weakness, and loss of sensations.        Pain Disability Index (PDI) Score Review:      9/17/2024    12:32 PM   Last 3 PDI Scores   Pain Disability Index (PDI) 35       Non-Pharmacologic Treatments:  Physical " Therapy/Home Exercise: yes  Ice/Heat:yes  TENS: no  Acupuncture: no  Massage: yes  Chiropractic: yes    Other: no      Pain Medications:  - Adjuvant Medications: Robaxin ( Methocarbamol), trazodone, paroxetine, oxcarbazepine, Celebrex,Ubrelvy    Pain Procedures:   None                    Review of Systems:   GENERAL:  No weight loss, malaise or fevers.  HEENT:   No recent changes in vision or hearing  NECK:  Negative for lumps, no difficulty with swallowing.  RESPIRATORY:  Negative for cough, wheezing or shortness of breath, patient denies any recent URI.  CARDIOVASCULAR:  Negative for chest pain or palpitations.  GI:  Negative for abdominal discomfort, blood in stools or black stools or change in bowel habits.  MUSCULOSKELETAL:  See HPI.  SKIN:  Negative for lesions, rash, and itching.  PSYCH:  No mood disorder or recent psychosocial stressors.   HEMATOLOGY/LYMPHOLOGY:  Negative for prolonged bleeding, bruising easily or swollen nodes.    NEURO:   No history of syncope, paralysis, seizures or tremors.  All other reviewed and negative other than HPI.          OBJECTIVE:    Telemedicine Physical Exam:   Vitals:    11/12/24 1450   Resp: 16   There is no height or weight on file to calculate BMI.   (reviewed on 11/12/2024)     (Physical exam performed virtually with patient guided on specific actions and diagnostic maneuvers)  GENERAL: Well appearing, in no acute distress, alert and oriented x3.  Cooperative.  PSYCH:  Mood and affect appropriate.  SKIN: Skin color & texture with no obvious abnormalities.    HEAD/FACE:  Normocephalic, atraumatic.    PULM:  No difficulty breathing. No nasal flaring. No obvious wheezing.  EXTREMITIES: No obvious deformities. Moving all extremities well, appears to have symmetric strength throughout.  MUSCULOSKELETAL: No obvious atrophy abnormalities are noted.   NEURO: No obvious neurologic deficit.   GAIT: sitting.     Physical Exam: last in clinic visit:  GENERAL: Well appearing, in no  acute distress, alert and oriented x3.  PSYCH:  Mood and affect appropriate.  SKIN: Skin color, texture, turgor normal, no rashes or lesions.  HEAD/FACE:  Normocephalic, atraumatic. Cranial nerves grossly intact.    NECK:  pain to palpation over the cervical paraspinous muscles. Spurling Negative. pain with neck flexion, extension, or lateral flexion.   Normaltesting biceps, triceps and brachioradialis bilaterally.    NegativeHoffmann's bilaterally.    5/5 strength testing deltoid, biceps, triceps, wrist extensor, wrist flexor and ulnar intrinsics bilaterally.    Normal  strength bilaterally    CV: RRR with palpation of the radial artery.  PULM: No evidence of respiratory difficulty, symmetric chest rise.  GI:  Soft and non-tender.    BACK: Straight leg raising in the sitting and supine positions is negative to radicular pain. No pain to palpation over the facet joints of the lumbar spine or spinous processes. Normal range of motion without pain reproduction.  EXTREMITIES: Peripheral joint ROM is full and pain free without obvious instability or laxity in all four extremities. No deformities, edema, or skin discoloration. Good capillary refill.  MUSCULOSKELETAL: Able to stand on heels & toes.   Shoulder, hip, and knee provocative maneuvers are negative.  There is no pain with palpation over the sacroiliac joints bilaterally.  Gaenslen's, Distraction/Compression and  FABERs test is positive.  Facet loading test is negative bilaterally.   Bilateral upper and lower extremity strength is normal and symmetric.  No atrophy or tone abnormalities are noted.    RIGHT Lower extremity: Hip flexion 5/5, Hip Abduction 5/5, Hip Adduction 5/5, Knee extension 5/5, Knee flexion 5/5, Ankle dorsiflexion5/5, Extensor hallucis longus 5/5, Ankle plantarflexion 5/5  LEFT Lower extremity:  Hip flexion 5/5, Hip Abduction 5/5,Hip Adduction 5/5, Knee extension 5/5, Knee flexion 5/5, Ankle dorsiflexion 5/5, Extensor hallucis longus 5/5,  Ankle plantarflexion 5/5  -Normal testing knee (patellar) jerk and ankle (achilles) jerk    NEURO: Bilateral upper and lower extremity coordination and muscle stretch reflexes are physiologic and symmetric. No loss of sensation is noted.  GAIT: normal.            Imaging/ Diagnostic Studies/ Labs (Reviewed on 11/12/2024):    09/17/2024 X-Ray Lumbar Complete Including Flex And Ext  COMPARISON: None.  FINDINGS:  The vertebral bodies demonstrate a normal height and alignment.  The disc space heights are well maintained.  No pars defects are identified.    MRI sacroiliac joints 11/10/2023  FINDINGS: The sacroiliac joint spaces are well-preserved and normal in appearance without erosions, bony ankylosis, abnormal subchondral marrow signal, abnormal enhancement or other evidence of sacroiliitis. No fracture or abnormal marrow signal. The visualized lower lumbar spine is normal. The muscles and remaining soft tissues are normal.     X-ray sacroiliac joints 04/26/2023  FINDINGS: Sacroiliac joints are normal. No spurring or sclerosis. Osseous structures intact.       8/11/23 X-Ray Cervical Spine AP And Lateral  COMPARISON:  None  FINDINGS:  There is some straightening of the normal cervical lordosis which can be associated with muscle spasm.  Vertebral body heights are within normal limits.  No definite spondylolisthesis demonstrated.  Intervertebral disk spaces are well maintained.  Posterior elements appear intact without acute fractures or subluxations demonstrated.  Odontoid process appears intact.  Atlantoaxial articulations appear normal.  Prevertebral soft tissues are within normal limits.            ASSESSMENT: 29 y.o. year old female with     1. Myofascial pain syndrome of lumbar spine        2. Cervical myofascial pain syndrome        3. Hypophosphatasia                PLAN:   - Interventions:  We have discussed considering future cervical, thoracic or lumbar myofascial trigger point injections in clinic to address  musculoskeletal pain. Explained the risks and benefits of the procedure in detail with the patient today in clinic along with alternative treatment options.    - Anticoagulation use: No no anticoagulation    - Medications:  -DC Robaxin 2/2 inefficacy    - Refill tizanidine 4 mg up to BID to see if this helps with myofascial pain.  We have discussed potential deleterious side effects associated with this medication including  dizziness, drowsiness, dry mouth or tingling sensation in the upper or lower extremities.      report:  Reviewed and consistent with medication use as prescribed.      - Therapy: Continue physician-directed at home exercises learned from physical therapy/ ambulation/ stretching to help manage the patient/s painful condition. Continue use of tens unit.         - Imaging:   -X-ray lumbar spine to better evaluate pain- reviewed.  -Reviewed available imaging (MRI sacroiliac joints, x-ray sacroiliac joints, x-ray cervical spine) with patient and answered any questions they had regarding study.      - Consults/Referrals:(PRN)  -Rheumatology: Ms. Sandoval PAC  -Endocrinology: Dr. Sr    - Follow up visit: PRN -- in clinic TPI if needed      Future Appointments   Date Time Provider Department Center   12/30/2024  2:00 PM [address]       - Patient Questions: Answered all of the patient's questions regarding diagnosis, therapy, and treatment.    - This condition does not require this patient to take time off of work, and the primary goal of our Pain Management services is to improve the patient's functional capacity.   - I discussed the risks, benefits, and alternatives to potential treatment options. All questions and concerns were fully addressed today in clinic.         Nan Anderson PA-C  Interventional Pain Management - [facility]    Disclaimer:  This note was prepared using voice recognition system and is likely to have sound alike errors that may have been  overlooked even after proof reading.  Please call me with any questions.

## 2024-11-12 VITALS — RESPIRATION RATE: 16 BRPM

## 2024-12-31 ENCOUNTER — APPOINTMENT (OUTPATIENT)
Dept: RADIOLOGY | Facility: HOSPITAL | Age: 29
End: 2024-12-31
Attending: INTERNAL MEDICINE
Payer: COMMERCIAL

## 2024-12-31 DIAGNOSIS — E83.39 HYPOPHOSPHATASIA: ICD-10-CM

## 2024-12-31 PROCEDURE — 76770 US EXAM ABDO BACK WALL COMP: CPT | Mod: 26,,, | Performed by: STUDENT IN AN ORGANIZED HEALTH CARE EDUCATION/TRAINING PROGRAM

## 2024-12-31 PROCEDURE — 76770 US EXAM ABDO BACK WALL COMP: CPT | Mod: TC,PO

## 2025-07-29 ENCOUNTER — TELEPHONE (OUTPATIENT)
Dept: ENDOCRINOLOGY | Facility: CLINIC | Age: 30
End: 2025-07-29
Payer: COMMERCIAL

## 2025-07-29 NOTE — TELEPHONE ENCOUNTER
Spoke with Louis from [employer] regarding patient's Strensiq prescription. Medication authorization expires in October and they are working on re-authorization but need chart notes. Will be re-faxing over a form to our office to complete.

## 2025-07-29 NOTE — TELEPHONE ENCOUNTER
Copied from CRM #[id number]. Topic: Medications - Medication Authorization  >> Jul 25, 2025  3:17 PM Tevin Yang wrote:  PATIENT CALL    Keyla from [employer] called regarding STRENSI[employer]. States that she sent over a request for clinical info on 07/11, no reply. Please call back at [phone] to discuss further      [facility] (Preferred) - [place] - [address]  [address] [facility] [address] [address]  Phone: [phone] Fax: [phone]  >> Jul 28, 2025  3:13 PM Pharmacist Keyla wrote:    ----- Message -----  From: Tevin Yang  Sent: 7/25/2025   3:20 PM CDT  To: Remberto Robles

## 2025-08-12 ENCOUNTER — PATIENT MESSAGE (OUTPATIENT)
Dept: ENDOCRINOLOGY | Facility: CLINIC | Age: 30
End: 2025-08-12
Payer: COMMERCIAL

## 2025-08-21 ENCOUNTER — PATIENT MESSAGE (OUTPATIENT)
Dept: ENDOCRINOLOGY | Facility: CLINIC | Age: 30
End: 2025-08-21
Payer: COMMERCIAL

## 2025-08-26 ENCOUNTER — OFFICE VISIT (OUTPATIENT)
Dept: ENDOCRINOLOGY | Facility: CLINIC | Age: 30
End: 2025-08-26
Payer: COMMERCIAL

## 2025-08-26 DIAGNOSIS — E83.39 HYPOPHOSPHATASIA: Primary | ICD-10-CM

## 2025-08-26 PROCEDURE — G2211 COMPLEX E/M VISIT ADD ON: HCPCS | Mod: 95,,, | Performed by: INTERNAL MEDICINE

## 2025-08-26 PROCEDURE — 98005 SYNCH AUDIO-VIDEO EST LOW 20: CPT | Mod: 95,,, | Performed by: INTERNAL MEDICINE
